# Patient Record
Sex: FEMALE | Race: WHITE | NOT HISPANIC OR LATINO | Employment: OTHER | URBAN - METROPOLITAN AREA
[De-identification: names, ages, dates, MRNs, and addresses within clinical notes are randomized per-mention and may not be internally consistent; named-entity substitution may affect disease eponyms.]

---

## 2017-01-04 ENCOUNTER — TELEPHONE (OUTPATIENT)
Dept: OBSTETRICS AND GYNECOLOGY | Facility: CLINIC | Age: 82
End: 2017-01-04

## 2017-01-04 NOTE — TELEPHONE ENCOUNTER
----- Message from Susie Burgos sent at 1/4/2017 10:22 AM CST -----  Contact: self  [phone]  Pt is requesting to speak to you regarding her appt with Dr. Falk. Pls call pt [phone]. Thanks......Kaila

## 2017-01-04 NOTE — TELEPHONE ENCOUNTER
Called pt she is c/o runny stools. Pt advised to try OTC imodium to see if that would help. Pt was not sure who she would need to contact for that. Pt advised to contact her PCP if Imodium does not help.

## 2017-02-02 ENCOUNTER — OFFICE VISIT (OUTPATIENT)
Dept: UROLOGY | Facility: CLINIC | Age: 82
End: 2017-02-02
Payer: MEDICARE

## 2017-02-02 VITALS
HEART RATE: 60 BPM | SYSTOLIC BLOOD PRESSURE: 124 MMHG | WEIGHT: 181 LBS | RESPIRATION RATE: 16 BRPM | DIASTOLIC BLOOD PRESSURE: 72 MMHG | BODY MASS INDEX: 30.9 KG/M2 | HEIGHT: 64 IN

## 2017-02-02 DIAGNOSIS — R31.0 HEMATURIA, GROSS: Primary | ICD-10-CM

## 2017-02-02 PROCEDURE — 99213 OFFICE O/P EST LOW 20 MIN: CPT | Mod: PBBFAC | Performed by: UROLOGY

## 2017-02-02 PROCEDURE — 99203 OFFICE O/P NEW LOW 30 MIN: CPT | Mod: S$PBB,,, | Performed by: UROLOGY

## 2017-02-02 PROCEDURE — 99999 PR PBB SHADOW E&M-EST. PATIENT-LVL III: CPT | Mod: PBBFAC,,, | Performed by: UROLOGY

## 2017-02-02 PROCEDURE — 81002 URINALYSIS NONAUTO W/O SCOPE: CPT | Mod: PBBFAC | Performed by: UROLOGY

## 2017-02-02 RX ORDER — AZELAIC ACID 0.15 G/G
GEL TOPICAL 2 TIMES DAILY
COMMUNITY

## 2017-02-02 RX ORDER — LEVOTHYROXINE SODIUM 100 UG/1
TABLET ORAL
COMMUNITY
Start: 2017-01-04

## 2017-02-02 RX ORDER — DIPHENOXYLATE HYDROCHLORIDE AND ATROPINE SULFATE 2.5; .025 MG/1; MG/1
TABLET ORAL
COMMUNITY
Start: 2017-01-06 | End: 2017-04-05

## 2017-02-02 NOTE — PROGRESS NOTES
"  Subjective:       Ella Sarmiento is a 81 y.o. female who is a new patient who was referred by Dr Cooper for evaluation of hematuria.      Hematuria  Patient complains of gross hematuria. Onset of hematuria was 2 months ago and was sudden in onset. She had some crampy, abdominal pain very briefly at that time. There is not a history of nephrolithiasis. There is not a history of urologic trauma. Other urologic symptoms include rare urge incontinence. Patient admits to history of no risk factors for cancer. Patient denies history of chronic Vera catheter,  surgeries, occupational exposure, sexually transmitted diseases, tobacco use, trauma and urolithiasis. Prior workup has been none. Denies UTIs.       The following portions of the patient's history were reviewed and updated as appropriate: allergies, current medications, past family history, past medical history, past social history, past surgical history and problem list.    Review of Systems  Constitutional: no fever or chills  ENT: no nasal congestion or sore throat  Respiratory: no cough or shortness of breath  Cardiovascular: no chest pain or palpitations  Gastrointestinal: no nausea or vomiting, tolerating diet  Genitourinary: as per HPI  Hematologic/Lymphatic: no easy bruising or lymphadenopathy  Musculoskeletal: no arthralgias or myalgias  Skin: no rashes or lesions  Neurological: no seizures or tremors  Behavioral/Psych: no auditory or visual hallucinations       Objective:    Vitals:   Visit Vitals    /72    Pulse 60    Resp 16    Ht 5' 4" (1.626 m)    Wt 82.1 kg (181 lb)    BMI 31.07 kg/m2       Physical Exam   General: well developed, well nourished in no acute distress  Head: normocephalic, atraumatic  Neck: supple, trachea midline, no obvious enlargement of thyroid  HEENT: EOMI, mucus membranes moist, sclera anicteric, no hearing impairment  Lungs: symmetric expansion, non-labored breathing  Cardiovascular: regular rate and rhythm, " normal pulses  Abdomen: soft, non tender, non distended, no palpable masses, no hepatosplenomegaly, no hernias, no CVA tenderness  Musculoskeletal: no peripheral edema, normal ROM in bilateral upper and lower extremities  Lymphatics: no cervical or inguinal lymphadenopathy  Skin: no rashes or lesions  Neuro: alert and oriented x 3, no gross deficits  Psych: normal judgment and insight, normal mood/affect and non-anxious  Genitourinary:   patient declined exam       Lab Review   Urine analysis today in clinic shows negative for all components     No results found for: WBC, HGB, HCT, MCV, PLT  No results found for: CREATININE, BUN    Imaging  NA         Assessment/Plan:      1. Hematuria, gross    - Discussed etiology and workup of hematuria   - Ucx - urine clear today, no need for UCx   - CT urogram   - Office cystoscopy          Follow up in 3-4 weeks for cystoscopy

## 2017-02-02 NOTE — LETTER
February 2, 2017      Taqureia Cooper MD  [address]  [address]  [place] [address]           [facility]  [address] [facility] [address]  [address]  [place] [facility] [place] [address]  Phone: [phone]          Patient: Ella Sarmiento   MR Number: [id number]   YOB: 1935   Date of Visit: 2/2/2017       Dear Dr. Taqueria Cooper:    Thank you for referring Ella Sarmiento to me for evaluation. Attached you will find relevant portions of my assessment and plan of care.    If you have questions, please do not hesitate to call me. I look forward to following Ella Sarmiento along with you.    Sincerely,    Ifrah Falk MD    Enclosure  CC:  No Recipients    If you would like to receive this communication electronically, please contact [email or web address] or [phone] to request more information on [employer] [facility] access.    For providers and/or their staff who would like to refer a patient to [facility], please contact us through our one-stop-shop provider referral line, [facility], at [phone].    If you feel you have received this communication in error or would no longer like to receive these types of communications, please e-mail [email or web address]

## 2017-02-02 NOTE — MR AVS SNAPSHOT
[facility]  [address] [facility]  [address]  [place] [address]  Phone: [phone]                  Ella Sarmiento   2017 1:20 PM   Office Visit    Description:  Female : 1935   Provider:  Ifrah Fakl MD   Department:  [facility]           Reason for Visit     Hematuria           Diagnoses this Visit        Comments    Hematuria, gross    -  Primary            To Do List           Future Appointments        Provider Department Dept Phone    3/29/2017 2:00 PM Taqueria Cooper MD [facility] [place] [facility]/ [phone]      Goals (5 Years of Data)     None      Follow-Up and Disposition     Return in about 4 weeks (around 3/2/2017) for CT scan follow up, Cystoscopy.      [facility]     [facility] Nurse Care Line -  Assistance  Registered nurses in the [facility] provide clinical advisement, health education, appointment booking, and other advisory services.  Call for this free service at [phone].             Medications           STOP taking these medications     estradiol valerate (DELESTROGEN) 20 mg/mL injection Inject 20 mg into the muscle every 28 days.           Verify that the below list of medications is an accurate representation of the medications you are currently taking.  If none reported, the list may be blank. If incorrect, please contact your healthcare provider. Carry this list with you in case of emergency.           Current Medications     aspirin (ECOTRIN) 81 MG EC tablet Take 81 mg by mouth once daily.    azelaic acid (FINACEA) 15 % cream Apply topically 2 (two) times daily.    cetirizine (ZYRTEC) 10 MG tablet Take 10 mg by mouth once daily.    cyanocobalamin (B-12 DOTS) 500 MCG tablet Take 500 mcg by mouth once daily.    DIPHENHYDRAMINE HCL (BENADRYL ALLERGY ORAL) Take by mouth.    diphenoxylate-atropine 2.5-0.025 mg (LOMOTIL) 2.5-0.025 mg per tablet     econazole nitrate 1 % cream Apply topically once daily.    FIBER  "CHOICE ORAL Take by mouth.    L GASSERI/B BIFIDUM/B LONGUM ([employer] HEALTH ORAL) Take by mouth.    levothyroxine (SYNTHROID) 100 MCG tablet     msm-aloe vera-herbal66-emu oil Gel Apply topically.    multivit-mineral-iron-lutein (CENTRUM SILVER ULTRA WOMEN'S) Tab Take by mouth.           Clinical Reference Information           Your Vitals Were     BP Pulse Resp Height Weight BMI    124/72 60 16 5' 4" (1.626 m) 82.1 kg (181 lb) 31.07 kg/m2      Blood Pressure          Most Recent Value    BP  124/72      Allergies as of 2/2/2017     Keflex [Cephalexin]      Immunizations Administered on Date of Encounter - 2/2/2017     None      Orders Placed During Today's Visit     Future Labs/Procedures Expected by Expires    CT Urogram Abd Pelvis W WO  2/2/2017 2/2/2018    Cystoscopy  As directed 2/2/2018      [facility] Sign-Up     Activating your [facility] account is as easy as 1-2-3!     1) Visit [email or web address], select Sign Up Now, enter this activation code and your date of birth, then select Next.  [email or web address]  Expires: 2/12/2017  1:04 PM      2) Create a username and password to use when you visit [facility] in the future and select a security question in case you lose your password and select Next.    3) Enter your e-mail address and click Sign Up!    Additional Information  If you have questions, please e-mail [email or web address] or call [phone] to talk to our [facility] staff. Remember, [facility] is NOT to be used for urgent needs. For medical emergencies, dial 911.         Language Assistance Services     ATTENTION: Language assistance services are available, free of charge. Please call [phone].      ATENCIÓN: Si petela krystinajoya, tiene a teran disposición servicios gratuitos de asistencia lingüística. Llame al [phone].     CHÚ Ý: N?u b?n nói Ti?ng Vi?t, có các d?ch v? h? tr? ngôn ng? mi?n phí dành cho b?n. G?i s? [phone].         [facility] complies with applicable Federal " civil rights laws and does not discriminate on the basis of race, color, national origin, age, disability, or sex.

## 2017-02-03 LAB
BILIRUB SERPL-MCNC: NORMAL MG/DL
BLOOD URINE, POC: NORMAL
COLOR, POC UA: NORMAL
GLUCOSE UR QL STRIP: NORMAL
KETONES UR QL STRIP: NORMAL
LEUKOCYTE ESTERASE URINE, POC: NORMAL
NITRITE, POC UA: NORMAL
PH, POC UA: 6
PROTEIN, POC: NORMAL
SPECIFIC GRAVITY, POC UA: 1005
UROBILINOGEN, POC UA: NORMAL

## 2017-02-10 ENCOUNTER — TELEPHONE (OUTPATIENT)
Dept: UROLOGY | Facility: CLINIC | Age: 82
End: 2017-02-10

## 2017-02-10 DIAGNOSIS — R31.0 GROSS HEMATURIA: Primary | ICD-10-CM

## 2017-02-10 NOTE — TELEPHONE ENCOUNTER
----- Message from Bernice Rosenthal sent at 2/10/2017 11:00 AM CST -----  Patient will be coming in for a Ct scan patient will need lab orders for creatinine please. Thanks

## 2017-02-15 ENCOUNTER — HOSPITAL ENCOUNTER (OUTPATIENT)
Dept: RADIOLOGY | Facility: HOSPITAL | Age: 82
Discharge: HOME | End: 2017-02-15
Attending: UROLOGY
Payer: MEDICARE

## 2017-02-15 DIAGNOSIS — R31.0 HEMATURIA, GROSS: ICD-10-CM

## 2017-02-15 PROCEDURE — 74178 CT ABD&PLV WO CNTR FLWD CNTR: CPT | Mod: 26,,, | Performed by: RADIOLOGY

## 2017-02-15 PROCEDURE — 74178 CT ABD&PLV WO CNTR FLWD CNTR: CPT | Mod: TC

## 2017-02-15 PROCEDURE — 25500020 PHARM REV CODE 255: Performed by: UROLOGY

## 2017-02-15 RX ADMIN — IOHEXOL 125 ML: 350 INJECTION, SOLUTION INTRAVENOUS at 09:02

## 2017-02-16 ENCOUNTER — TELEPHONE (OUTPATIENT)
Dept: UROLOGY | Facility: CLINIC | Age: 82
End: 2017-02-16

## 2017-02-20 ENCOUNTER — PROCEDURE VISIT (OUTPATIENT)
Dept: UROLOGY | Facility: CLINIC | Age: 82
End: 2017-02-20
Payer: MEDICARE

## 2017-02-20 DIAGNOSIS — R31.0 HEMATURIA, GROSS: ICD-10-CM

## 2017-02-20 PROCEDURE — 52000 CYSTOURETHROSCOPY: CPT | Mod: PBBFAC | Performed by: UROLOGY

## 2017-02-20 NOTE — H&P
"  []Hover for attribution information     Subjective:        Ella Sarmiento is a 81 y.o. female who is a new patient who was referred by Dr Cooper for evaluation of hematuria.      Hematuria  Patient complains of gross hematuria. Onset of hematuria was 2 months ago and was sudden in onset. She had some crampy, abdominal pain very briefly at that time. There is not a history of nephrolithiasis. There is not a history of urologic trauma. Other urologic symptoms include rare urge incontinence. Patient admits to history of no risk factors for cancer. Patient denies history of chronic Vera catheter,  surgeries, occupational exposure, sexually transmitted diseases, tobacco use, trauma and urolithiasis. Prior workup has been none. Denies UTIs.         The following portions of the patient's history were reviewed and updated as appropriate: allergies, current medications, past family history, past medical history, past social history, past surgical history and problem list.     Review of Systems  Constitutional: no fever or chills  ENT: no nasal congestion or sore throat  Respiratory: no cough or shortness of breath  Cardiovascular: no chest pain or palpitations  Gastrointestinal: no nausea or vomiting, tolerating diet  Genitourinary: as per HPI  Hematologic/Lymphatic: no easy bruising or lymphadenopathy  Musculoskeletal: no arthralgias or myalgias  Skin: no rashes or lesions  Neurological: no seizures or tremors  Behavioral/Psych: no auditory or visual hallucinations        Objective:    Vitals:        Visit Vitals    /72    Pulse 60    Resp 16    Ht 5' 4" (1.626 m)    Wt 82.1 kg (181 lb)    BMI 31.07 kg/m2         Physical Exam   General: well developed, well nourished in no acute distress  Head: normocephalic, atraumatic  Neck: supple, trachea midline, no obvious enlargement of thyroid  HEENT: EOMI, mucus membranes moist, sclera anicteric, no hearing impairment  Lungs: symmetric expansion, non-labored " "breathing  Cardiovascular: regular rate and rhythm, normal pulses  Abdomen: soft, non tender, non distended, no palpable masses, no hepatosplenomegaly, no hernias, no CVA tenderness  Musculoskeletal: no peripheral edema, normal ROM in bilateral upper and lower extremities  Lymphatics: no cervical or inguinal lymphadenopathy  Skin: no rashes or lesions  Neuro: alert and oriented x 3, no gross deficits  Psych: normal judgment and insight, normal mood/affect and non-anxious  Genitourinary:   patient declined exam         Lab Review   Urine analysis today in clinic shows negative for all components      No results found for: WBC, HGB, HCT, MCV, PLT  No results found for: CREATININE, BUN     Imaging  NA           Assessment/Plan:      1. Hematuria, gross   - Discussed etiology and workup of hematuria  - Ucx - urine clear today, no need for UCx  - CT urogram  - Office cystoscopy             Cystoscopy  Date/Time: 2/20/2017 9:52 AM  Performed by: LESLY BAKER  Authorized by: LESLY BAKER     Consent Done?:  Yes (Written)  Time out: Immediately prior to procedure a "time out" was called to verify the correct patient, procedure, equipment, support staff and site/side marked as required.    Indications: hematuria    Position:  Dorsal lithotomy  Anesthesia:  Lidocaine jelly  Patient sedated?: No    Preparation: Patient was prepped and draped in usual sterile fashion      Scope type:  Flexible cystoscope  Stent inserted: No    Stent removed: No    External exam normal: Yes    Digital exam performed: No    Urethra normal: Yes  Bladder neck normal: Bladder neck normal   Bladder normal: No         Number of tumors:  1       Tumor 1:          Size (mm):  25         Anatomy:  Pedunculated         Location:  L Laterall Wall    Patient tolerance:  Patient tolerated the procedure well with no immediate complications     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 Recommend TURBT.            "

## 2017-02-20 NOTE — PROCEDURES
"Cystoscopy  Date/Time: 2/20/2017 9:52 AM  Performed by: LESLY BAKER  Authorized by: LESLY BAKER     Consent Done?:  Yes (Written)  Time out: Immediately prior to procedure a "time out" was called to verify the correct patient, procedure, equipment, support staff and site/side marked as required.    Indications: hematuria    Position:  Dorsal lithotomy  Anesthesia:  Lidocaine jelly  Patient sedated?: No    Preparation: Patient was prepped and draped in usual sterile fashion      Scope type:  Flexible cystoscope  Stent inserted: No    Stent removed: No    External exam normal: Yes    Digital exam performed: No    Urethra normal: Yes  Bladder neck normal: Bladder neck normal   Bladder normal: No         Number of tumors:  1       Tumor 1:          Size (mm):  25         Anatomy:  Pedunculated         Location:  L Laterall Wall    Patient tolerance:  Patient tolerated the procedure well with no immediate complications     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 Recommend TURBT.        "

## 2017-02-24 ENCOUNTER — HOSPITAL ENCOUNTER (OUTPATIENT)
Dept: PREADMISSION TESTING | Facility: HOSPITAL | Age: 82
Discharge: HOME | End: 2017-02-24
Attending: UROLOGY
Payer: MEDICARE

## 2017-02-24 ENCOUNTER — HOSPITAL ENCOUNTER (OUTPATIENT)
Dept: RADIOLOGY | Facility: HOSPITAL | Age: 82
Discharge: HOME | End: 2017-02-24
Attending: UROLOGY
Payer: MEDICARE

## 2017-02-24 VITALS
WEIGHT: 175.5 LBS | HEART RATE: 75 BPM | TEMPERATURE: 97 F | OXYGEN SATURATION: 97 % | HEIGHT: 62 IN | DIASTOLIC BLOOD PRESSURE: 85 MMHG | SYSTOLIC BLOOD PRESSURE: 129 MMHG | RESPIRATION RATE: 16 BRPM | BODY MASS INDEX: 32.3 KG/M2

## 2017-02-24 DIAGNOSIS — Z01.818 PRE-OP TESTING: Primary | ICD-10-CM

## 2017-02-24 LAB
ANION GAP SERPL CALC-SCNC: 7 MMOL/L
BASOPHILS # BLD AUTO: 0.08 K/UL
BASOPHILS NFR BLD: 1.1 %
BUN SERPL-MCNC: 16 MG/DL
CALCIUM SERPL-MCNC: 9.7 MG/DL
CHLORIDE SERPL-SCNC: 106 MMOL/L
CO2 SERPL-SCNC: 28 MMOL/L
CREAT SERPL-MCNC: 0.8 MG/DL
DIFFERENTIAL METHOD: ABNORMAL
EOSINOPHIL # BLD AUTO: 0.1 K/UL
EOSINOPHIL NFR BLD: 1.3 %
ERYTHROCYTE [DISTWIDTH] IN BLOOD BY AUTOMATED COUNT: 12.9 %
EST. GFR  (AFRICAN AMERICAN): >60 ML/MIN/1.73 M^2
EST. GFR  (NON AFRICAN AMERICAN): >60 ML/MIN/1.73 M^2
GLUCOSE SERPL-MCNC: 95 MG/DL
HCT VFR BLD AUTO: 43.4 %
HGB BLD-MCNC: 14.7 G/DL
LYMPHOCYTES # BLD AUTO: 2.4 K/UL
LYMPHOCYTES NFR BLD: 31.2 %
MCH RBC QN AUTO: 31.5 PG
MCHC RBC AUTO-ENTMCNC: 33.9 %
MCV RBC AUTO: 93 FL
MONOCYTES # BLD AUTO: 0.7 K/UL
MONOCYTES NFR BLD: 8.6 %
NEUTROPHILS # BLD AUTO: 4.3 K/UL
NEUTROPHILS NFR BLD: 57.7 %
PLATELET # BLD AUTO: 191 K/UL
PMV BLD AUTO: 10.4 FL
POTASSIUM SERPL-SCNC: 4.3 MMOL/L
RBC # BLD AUTO: 4.67 M/UL
SODIUM SERPL-SCNC: 141 MMOL/L
WBC # BLD AUTO: 7.53 K/UL

## 2017-02-24 PROCEDURE — 71020 XR CHEST PA AND LATERAL PRE-OP: CPT | Mod: 26,,, | Performed by: RADIOLOGY

## 2017-02-24 PROCEDURE — 93005 ELECTROCARDIOGRAM TRACING: CPT

## 2017-02-24 PROCEDURE — 71020 XR CHEST PA AND LATERAL PRE-OP: CPT | Mod: TC

## 2017-02-24 PROCEDURE — 36415 COLL VENOUS BLD VENIPUNCTURE: CPT

## 2017-02-24 PROCEDURE — 80048 BASIC METABOLIC PNL TOTAL CA: CPT

## 2017-02-24 PROCEDURE — 85025 COMPLETE CBC W/AUTO DIFF WBC: CPT

## 2017-02-24 RX ORDER — HYDROXYZINE HYDROCHLORIDE 25 MG/1
25 TABLET, FILM COATED ORAL 4 TIMES DAILY PRN
COMMUNITY
End: 2018-04-04

## 2017-02-24 NOTE — DISCHARGE INSTRUCTIONS
Your surgery is scheduled for _Friday March 3, 2017__.    Call [phone] between 2 p.m. and 5 p.m. on   _Thursday__ to find out your arrival time for the day of your surgery.      Please report to SAME DAY SURGERY UNIT on the 2nd FLOOR at _______ a.m.  Use front door entrance. The doors open at 0530 am.          INSTRUCTIONS IMPORTANT!!!  ¨ Do not eat or drink after 12 midnight-including water. OK to brush teeth, no   gum, candy or mints!    ¨ Take only these medicines with a small swallow of water-morning of surgery.  Take Synthroid and Zyrtec (if needed) am of surgery with swallow of water.        _x___  Prep instructions:  SHOWER     _x___  Do not wear makeup, including mascara. WEARING EYE MAKEUP MAY  LEAD TO SERIOUS EYE INJURY during surgery.  _x___  No powder, lotions or creams to surgical area.  _x___  You may wear only deodorant on the day of surgery.  _x___  Please remove all jewelry, including piercings and leave at home.  _x___  No money or valuables needed. Please leave at home.  You may bring your cell phone.    _x___  If going home the same day, arrange for a ride home. You will not be able to   drive if Anesthesia was used.  _x___  Wear loose fitting clothing. Allow for dressings, bandages.  _x___  Stop Aspirin, Ibuprofen, Motrin and Aleve at least 3-5 days before surgery, unless otherwise instructed by your doctor, or the nurse.              You MAY use Tylenol/acetaminophen until day of surgery.    _x___  Call MD for temperature above 101 degrees.        _x___ Stop taking any Fish Oil supplement or any Vitamins that contain Vitamin E at least 5 days prior to surgery.            I have read or had read and explained to me, and understand the above information.  Additional comments or instructions:Please call   [phone] if you have any questions regarding the instructions above.

## 2017-02-24 NOTE — IP AVS SNAPSHOT
[place] [address] [address] [place] [address]  Phone: [phone]           Patient Discharge Instructions    Our goal is to set you up for success. This packet includes information on your condition, medications, and your home care. It will help you to care for yourself so you don't get sicker.     Please ask your nurse if you have any questions.        There are many details to remember when preparing for your surgery. Here is what you will need to do, please ask your nurse if there are more specific instructions and if you have any questions:    1. 24 hours before procedure Do not smoke or drink alcoholic beverages 24 hours prior to your procedure    2. Eating before procedure Do not eat or drink anything 8 hours before your procedure - this includes gum, mints, and candy.     3. Day of procedure Please remove all jewelry for the procedure. If you wear contact lenses, dentures, hearing aids or glasses, bring a container to put them in during your surgery and give to a family member for safekeeping.  If your doctor has scheduled you for an overnight stay, bring a small overnight bag with any personal items that you need.    4. After procedure Make arrangements in advance for transportation home by a responsible adult. It is not safe to drive a vehicle during the 24 hours following surgery.     PLEASE NOTE: You may be contacted the day before your surgery to confirm your surgery date and arrival time. The Surgery schedule has many variables which may affect the time of your surgery case. Family members should be available if your surgery time changes.                [facility]  Unless otherwise directed by your provider, please contact [facility], our nurse care line that is available for 24/7 assistance.     [phone] (toll-free)    Registered nurses in the [facility] provide clinical advisement, health education, appointment booking, and other advisory  services.                    ** Verify the list of medication(s) below is accurate and up to date. Carry this with you in case of emergency. If your medications have changed, please notify your healthcare provider.             Medication List      TAKE these medications        Additional Info                      aspirin 81 MG EC tablet   Commonly known as:  ECOTRIN   Refills:  0   Dose:  81 mg    Instructions:  Take 81 mg by mouth once daily.     Begin Date    AM    Noon    PM    Bedtime       B-12 DOTS 500 MCG tablet   Refills:  0   Dose:  500 mcg   Generic drug:  cyanocobalamin    Instructions:  Take 500 mcg by mouth once daily.     Begin Date    AM    Noon    PM    Bedtime       BENADRYL ALLERGY ORAL   Refills:  0    Instructions:  Take by mouth.     Begin Date    AM    Noon    PM    Bedtime       CENTRUM SILVER ULTRA WOMEN'S Tab   Refills:  0   Generic drug:  multivit-mineral-iron-lutein    Instructions:  Take by mouth.     Begin Date    AM    Noon    PM    Bedtime       cetirizine 10 MG tablet   Commonly known as:  ZYRTEC   Refills:  0   Dose:  10 mg    Instructions:  Take 10 mg by mouth once daily.     Begin Date    AM    Noon    PM    Bedtime       diphenoxylate-atropine 2.5-0.025 mg 2.5-0.025 mg per tablet   Commonly known as:  LOMOTIL   Refills:  0      Begin Date    AM    Noon    PM    Bedtime       econazole nitrate 1 % cream   Refills:  0    Instructions:  Apply topically once daily.     Begin Date    AM    Noon    PM    Bedtime       FIBER CHOICE ORAL   Refills:  0    Instructions:  Take by mouth.     Begin Date    AM    Noon    PM    Bedtime       FINACEA 15 % cream   Refills:  0   Generic drug:  azelaic acid    Instructions:  Apply topically 2 (two) times daily.     Begin Date    AM    Noon    PM    Bedtime       hydrOXYzine HCl 25 MG tablet   Commonly known as:  ATARAX   Refills:  0   Dose:  25 mg    Instructions:  Take 25 mg by mouth 4 (four) times daily as needed for Itching.     Begin Date    AM     Noon    PM    Bedtime       levothyroxine 100 MCG tablet   Commonly known as:  SYNTHROID   Refills:  0      Begin Date    AM    Noon    PM    Bedtime       msm-aloe vera-herbal66-emu oil Gel   Refills:  0    Instructions:  Apply topically.     Begin Date    AM    Noon    PM    Bedtime       [facility] [employer] [facility] ORAL   Refills:  0    Instructions:  Take by mouth.     Begin Date    AM    Noon    PM    Bedtime                  Please bring to all follow up appointments:    1. A copy of your discharge instructions.  2. All medicines you are currently taking in their original bottles.  3. Identification and insurance card.    Please arrive 15 minutes ahead of scheduled appointment time.    Please call 24 hours in advance if you must reschedule your appointment and/or time.        Your Scheduled Appointments     Mar 29, 2017  2:00 PM CDT   Established Patient Visit with Taqueria Cooper MD   [facility]    [address] [facility] [address]  [address]  [place] [address] [phone]              Your Future Surgeries/Procedures     Mar 03, 2017   Surgery with Ifrah Falk MD   [facility])    [address] [facility] [address] [phone]                  Discharge Instructions         Your surgery is scheduled for _Friday March 3, 2017__.    Call [phone] between 2 p.m. and 5 p.m. on   _Thursday__ to find out your arrival time for the day of your surgery.      Please report to SAME DAY SURGERY UNIT on the 2nd FLOOR at _______ a.m.  Use front door entrance. The doors open at 0530 am.          INSTRUCTIONS IMPORTANT!!!  ¨ Do not eat or drink after 12 midnight-including water. OK to brush teeth, no   gum, candy or mints!    ¨ Take only these medicines with a small swallow of water-morning of surgery.  Take Synthroid and Zyrtec (if needed) am of surgery with swallow of water.        _x___  Prep instructions:  SHOWER     _x___  Do not wear makeup,  including mascara. WEARING EYE MAKEUP MAY  LEAD TO SERIOUS EYE INJURY during surgery.  _x___  No powder, lotions or creams to surgical area.  _x___  You may wear only deodorant on the day of surgery.  _x___  Please remove all jewelry, including piercings and leave at home.  _x___  No money or valuables needed. Please leave at home.  You may bring your cell phone.    _x___  If going home the same day, arrange for a ride home. You will not be able to   drive if Anesthesia was used.  _x___  Wear loose fitting clothing. Allow for dressings, bandages.  _x___  Stop Aspirin, Ibuprofen, Motrin and Aleve at least 3-5 days before surgery, unless otherwise instructed by your doctor, or the nurse.              You MAY use Tylenol/acetaminophen until day of surgery.    _x___  Call MD for temperature above 101 degrees.        _x___ Stop taking any Fish Oil supplement or any Vitamins that contain Vitamin E at least 5 days prior to surgery.            I have read or had read and explained to me, and understand the above information.  Additional comments or instructions:Please call   [phone] if you have any questions regarding the instructions above.                 Admission Information     Date & Time Provider Department CSN    2/24/2017  1:30 PM Ifrah Falk MD [facility] [id number]      Care Providers     Provider Role Specialty Primary office phone    Ifrah Falk MD Attending Provider Urology [phone]      Your Vitals Were     BP                   129/85 (BP Location: Left arm, Patient Position: Sitting, BP Method: Automatic)           Recent Lab Values     No lab values to display.      Allergies as of 2/24/2017        Reactions    Keflex [Cephalexin]       Advance Directives     An advance directive is a document which, in the event you are no longer able to make decisions for yourself, tells your healthcare team what kind of treatment you do or do not want to receive, or who you would  like to make those decisions for you.  If you do not currently have an advance directive, [facility] encourages you to create one.  For more information call:  [phone]), [phone] ([phone]),  or log on to [email or web address].        Language Assistance Services     ATTENTION: Language assistance services are available, free of charge. Please call [phone].      ATENCIÓN: Si habla español, tiene a teran disposición servicios gratuitos de asistencia lingüística. Llame al [phone].     CHÚ Ý: N?u b?n nói Ti?ng Vi?t, có các d?ch v? h? tr? ngôn ng? mi?n phí dành cho b?n. G?i s? [phone].        [facility] Sign-Up     Activating your [facility] account is as easy as 1-2-3!     1) Visit [email or web address], select Sign Up Now, enter this activation code and your date of birth, then select Next.  [email or web address]  Expires: 4/10/2017  2:11 PM      2) Create a username and password to use when you visit [facility] in the future and select a security question in case you lose your password and select Next.    3) Enter your e-mail address and click Sign Up!    Additional Information  If you have questions, please e-mail [email or web address] or call [phone] to talk to our [facility] staff. Remember, [facility] is NOT to be used for urgent needs. For medical emergencies, dial 911.          [facility] complies with applicable Federal civil rights laws and does not discriminate on the basis of race, color, national origin, age, disability, or sex.

## 2017-03-02 ENCOUNTER — ANESTHESIA EVENT (OUTPATIENT)
Dept: SURGERY | Facility: HOSPITAL | Age: 82
End: 2017-03-02
Payer: MEDICARE

## 2017-03-03 ENCOUNTER — ANESTHESIA (OUTPATIENT)
Dept: SURGERY | Facility: HOSPITAL | Age: 82
End: 2017-03-03
Payer: MEDICARE

## 2017-03-03 ENCOUNTER — HOSPITAL ENCOUNTER (OUTPATIENT)
Facility: HOSPITAL | Age: 82
Discharge: HOME | End: 2017-03-03
Attending: UROLOGY | Admitting: UROLOGY
Payer: MEDICARE

## 2017-03-03 ENCOUNTER — SURGERY (OUTPATIENT)
Age: 82
End: 2017-03-03

## 2017-03-03 VITALS
DIASTOLIC BLOOD PRESSURE: 62 MMHG | SYSTOLIC BLOOD PRESSURE: 116 MMHG | WEIGHT: 175.5 LBS | TEMPERATURE: 98 F | HEART RATE: 62 BPM | OXYGEN SATURATION: 98 % | HEIGHT: 62 IN | RESPIRATION RATE: 18 BRPM | BODY MASS INDEX: 32.3 KG/M2

## 2017-03-03 DIAGNOSIS — R31.0 HEMATURIA, GROSS: Primary | ICD-10-CM

## 2017-03-03 PROCEDURE — 37000008 HC ANESTHESIA 1ST 15 MINUTES: Performed by: UROLOGY

## 2017-03-03 PROCEDURE — 63600175 PHARM REV CODE 636 W HCPCS

## 2017-03-03 PROCEDURE — 37000009 HC ANESTHESIA EA ADD 15 MINS: Performed by: UROLOGY

## 2017-03-03 PROCEDURE — 52005 CYSTO W/URTRL CATHJ: CPT | Mod: 59,,, | Performed by: UROLOGY

## 2017-03-03 PROCEDURE — 36000707: Performed by: UROLOGY

## 2017-03-03 PROCEDURE — 25500020 PHARM REV CODE 255: Performed by: UROLOGY

## 2017-03-03 PROCEDURE — 27201423 OPTIME MED/SURG SUP & DEVICES STERILE SUPPLY: Performed by: UROLOGY

## 2017-03-03 PROCEDURE — D9220A PRA ANESTHESIA: Mod: CRNA,,, | Performed by: NURSE ANESTHETIST, CERTIFIED REGISTERED

## 2017-03-03 PROCEDURE — 36000706: Performed by: UROLOGY

## 2017-03-03 PROCEDURE — 52235 CYSTOSCOPY AND TREATMENT: CPT | Mod: ,,, | Performed by: UROLOGY

## 2017-03-03 PROCEDURE — 25000003 PHARM REV CODE 250: Performed by: NURSE ANESTHETIST, CERTIFIED REGISTERED

## 2017-03-03 PROCEDURE — 71000015 HC POSTOP RECOV 1ST HR: Performed by: UROLOGY

## 2017-03-03 PROCEDURE — D9220A PRA ANESTHESIA: Mod: ANES,,, | Performed by: ANESTHESIOLOGY

## 2017-03-03 PROCEDURE — 71000016 HC POSTOP RECOV ADDL HR: Performed by: UROLOGY

## 2017-03-03 PROCEDURE — 51720 TREATMENT OF BLADDER LESION: CPT | Mod: 59,,, | Performed by: UROLOGY

## 2017-03-03 PROCEDURE — 71000033 HC RECOVERY, INTIAL HOUR: Performed by: UROLOGY

## 2017-03-03 PROCEDURE — 63600175 PHARM REV CODE 636 W HCPCS: Performed by: NURSE ANESTHETIST, CERTIFIED REGISTERED

## 2017-03-03 PROCEDURE — 88309 TISSUE EXAM BY PATHOLOGIST: CPT | Mod: 26,,,

## 2017-03-03 PROCEDURE — 74420 UROGRAPHY RTRGR +-KUB: CPT | Mod: 26,,, | Performed by: UROLOGY

## 2017-03-03 PROCEDURE — 25000003 PHARM REV CODE 250: Performed by: ANESTHESIOLOGY

## 2017-03-03 PROCEDURE — 71000039 HC RECOVERY, EACH ADD'L HOUR: Performed by: UROLOGY

## 2017-03-03 PROCEDURE — 88309 TISSUE EXAM BY PATHOLOGIST: CPT

## 2017-03-03 PROCEDURE — 25000003 PHARM REV CODE 250

## 2017-03-03 PROCEDURE — C1758 CATHETER, URETERAL: HCPCS | Performed by: UROLOGY

## 2017-03-03 RX ORDER — PHENAZOPYRIDINE HYDROCHLORIDE 100 MG/1
100 TABLET, FILM COATED ORAL
Qty: 9 TABLET | Refills: 0 | Status: SHIPPED | OUTPATIENT
Start: 2017-03-03 | End: 2017-04-05 | Stop reason: CLARIF

## 2017-03-03 RX ORDER — ONDANSETRON 2 MG/ML
4 INJECTION INTRAMUSCULAR; INTRAVENOUS EVERY 12 HOURS PRN
Status: DISCONTINUED | OUTPATIENT
Start: 2017-03-03 | End: 2017-03-03 | Stop reason: HOSPADM

## 2017-03-03 RX ORDER — CIPROFLOXACIN 2 MG/ML
INJECTION, SOLUTION INTRAVENOUS
Status: COMPLETED
Start: 2017-03-03 | End: 2017-03-03

## 2017-03-03 RX ORDER — ACETAMINOPHEN 325 MG/1
650 TABLET ORAL EVERY 4 HOURS PRN
Status: DISCONTINUED | OUTPATIENT
Start: 2017-03-03 | End: 2017-03-03 | Stop reason: HOSPADM

## 2017-03-03 RX ORDER — SODIUM CHLORIDE 0.9 % (FLUSH) 0.9 %
3 SYRINGE (ML) INJECTION
Status: DISCONTINUED | OUTPATIENT
Start: 2017-03-03 | End: 2017-03-03 | Stop reason: HOSPADM

## 2017-03-03 RX ORDER — PROPOFOL 10 MG/ML
VIAL (ML) INTRAVENOUS
Status: DISCONTINUED | OUTPATIENT
Start: 2017-03-03 | End: 2017-03-03

## 2017-03-03 RX ORDER — SODIUM CHLORIDE, SODIUM LACTATE, POTASSIUM CHLORIDE, CALCIUM CHLORIDE 600; 310; 30; 20 MG/100ML; MG/100ML; MG/100ML; MG/100ML
INJECTION, SOLUTION INTRAVENOUS CONTINUOUS
Status: DISCONTINUED | OUTPATIENT
Start: 2017-03-03 | End: 2017-03-03 | Stop reason: HOSPADM

## 2017-03-03 RX ORDER — GLYCOPYRROLATE 0.2 MG/ML
INJECTION INTRAMUSCULAR; INTRAVENOUS
Status: COMPLETED
Start: 2017-03-03 | End: 2017-03-03

## 2017-03-03 RX ORDER — FENTANYL CITRATE 50 UG/ML
INJECTION, SOLUTION INTRAMUSCULAR; INTRAVENOUS
Status: DISCONTINUED | OUTPATIENT
Start: 2017-03-03 | End: 2017-03-03

## 2017-03-03 RX ORDER — DIPHENHYDRAMINE HYDROCHLORIDE 50 MG/ML
25 INJECTION INTRAMUSCULAR; INTRAVENOUS EVERY 6 HOURS PRN
Status: DISCONTINUED | OUTPATIENT
Start: 2017-03-03 | End: 2017-03-03 | Stop reason: HOSPADM

## 2017-03-03 RX ORDER — ONDANSETRON 2 MG/ML
INJECTION INTRAMUSCULAR; INTRAVENOUS
Status: COMPLETED
Start: 2017-03-03 | End: 2017-03-03

## 2017-03-03 RX ORDER — LIDOCAINE HYDROCHLORIDE 10 MG/ML
1 INJECTION, SOLUTION EPIDURAL; INFILTRATION; INTRACAUDAL; PERINEURAL ONCE
Status: DISCONTINUED | OUTPATIENT
Start: 2017-03-03 | End: 2017-03-03 | Stop reason: HOSPADM

## 2017-03-03 RX ORDER — CIPROFLOXACIN 500 MG/1
500 TABLET ORAL 2 TIMES DAILY
Qty: 4 TABLET | Refills: 0 | Status: SHIPPED | OUTPATIENT
Start: 2017-03-03 | End: 2017-03-05

## 2017-03-03 RX ORDER — HYDROCODONE BITARTRATE AND ACETAMINOPHEN 5; 325 MG/1; MG/1
1 TABLET ORAL EVERY 4 HOURS PRN
Status: DISCONTINUED | OUTPATIENT
Start: 2017-03-03 | End: 2017-03-03 | Stop reason: HOSPADM

## 2017-03-03 RX ORDER — PHENYLEPHRINE HYDROCHLORIDE 10 MG/ML
INJECTION INTRAVENOUS
Status: DISCONTINUED | OUTPATIENT
Start: 2017-03-03 | End: 2017-03-03

## 2017-03-03 RX ORDER — CIPROFLOXACIN 2 MG/ML
400 INJECTION, SOLUTION INTRAVENOUS
Status: DISCONTINUED | OUTPATIENT
Start: 2017-03-03 | End: 2017-03-03 | Stop reason: HOSPADM

## 2017-03-03 RX ORDER — NEOSTIGMINE METHYLSULFATE 1 MG/ML
INJECTION, SOLUTION INTRAVENOUS
Status: DISCONTINUED | OUTPATIENT
Start: 2017-03-03 | End: 2017-03-03

## 2017-03-03 RX ORDER — LIDOCAINE HCL/PF 100 MG/5ML
SYRINGE (ML) INTRAVENOUS
Status: DISCONTINUED | OUTPATIENT
Start: 2017-03-03 | End: 2017-03-03

## 2017-03-03 RX ORDER — ONDANSETRON 2 MG/ML
4 INJECTION INTRAMUSCULAR; INTRAVENOUS EVERY 8 HOURS PRN
Status: DISCONTINUED | OUTPATIENT
Start: 2017-03-03 | End: 2017-03-03 | Stop reason: HOSPADM

## 2017-03-03 RX ORDER — HYDROCODONE BITARTRATE AND ACETAMINOPHEN 5; 325 MG/1; MG/1
1 TABLET ORAL EVERY 6 HOURS PRN
Qty: 20 TABLET | Refills: 0 | Status: SHIPPED | OUTPATIENT
Start: 2017-03-03 | End: 2017-03-14

## 2017-03-03 RX ORDER — SUCCINYLCHOLINE CHLORIDE 20 MG/ML
INJECTION INTRAMUSCULAR; INTRAVENOUS
Status: DISCONTINUED | OUTPATIENT
Start: 2017-03-03 | End: 2017-03-03

## 2017-03-03 RX ORDER — HYDROMORPHONE HYDROCHLORIDE 2 MG/ML
0.2 INJECTION, SOLUTION INTRAMUSCULAR; INTRAVENOUS; SUBCUTANEOUS EVERY 5 MIN PRN
Status: DISCONTINUED | OUTPATIENT
Start: 2017-03-03 | End: 2017-03-03 | Stop reason: HOSPADM

## 2017-03-03 RX ORDER — HYDROMORPHONE HYDROCHLORIDE 2 MG/ML
0.5 INJECTION, SOLUTION INTRAMUSCULAR; INTRAVENOUS; SUBCUTANEOUS EVERY 4 HOURS PRN
Status: DISCONTINUED | OUTPATIENT
Start: 2017-03-03 | End: 2017-03-03 | Stop reason: HOSPADM

## 2017-03-03 RX ORDER — ROCURONIUM BROMIDE 10 MG/ML
INJECTION, SOLUTION INTRAVENOUS
Status: DISCONTINUED | OUTPATIENT
Start: 2017-03-03 | End: 2017-03-03

## 2017-03-03 RX ADMIN — GLYCOPYRROLATE 0.2 MG: 0.2 INJECTION, SOLUTION INTRAMUSCULAR; INTRAVENOUS at 11:03

## 2017-03-03 RX ADMIN — CIPROFLOXACIN 400 MG: 2 INJECTION, SOLUTION INTRAVENOUS at 11:03

## 2017-03-03 RX ADMIN — SUCCINYLCHOLINE CHLORIDE 120 MG: 20 INJECTION, SOLUTION INTRAMUSCULAR; INTRAVENOUS at 11:03

## 2017-03-03 RX ADMIN — ROCURONIUM BROMIDE 10 MG: 10 INJECTION, SOLUTION INTRAVENOUS at 11:03

## 2017-03-03 RX ADMIN — PHENYLEPHRINE HYDROCHLORIDE 100 MCG: 10 INJECTION INTRAVENOUS at 11:03

## 2017-03-03 RX ADMIN — GLYCOPYRROLATE 0.4 MG: 0.2 INJECTION, SOLUTION INTRAMUSCULAR; INTRAVENOUS at 12:03

## 2017-03-03 RX ADMIN — NEOSTIGMINE METHYLSULFATE 5 MG: 1 INJECTION INTRAVENOUS at 12:03

## 2017-03-03 RX ADMIN — LIDOCAINE HYDROCHLORIDE 100 MG: 20 INJECTION, SOLUTION INTRAVENOUS at 11:03

## 2017-03-03 RX ADMIN — ONDANSETRON 4 MG: 2 INJECTION, SOLUTION INTRAMUSCULAR; INTRAVENOUS at 11:03

## 2017-03-03 RX ADMIN — PHENYLEPHRINE HYDROCHLORIDE 200 MCG: 10 INJECTION INTRAVENOUS at 11:03

## 2017-03-03 RX ADMIN — IOHEXOL 100 ML: 300 INJECTION, SOLUTION INTRAVENOUS at 11:03

## 2017-03-03 RX ADMIN — SODIUM CHLORIDE, SODIUM LACTATE, POTASSIUM CHLORIDE, AND CALCIUM CHLORIDE: .6; .31; .03; .02 INJECTION, SOLUTION INTRAVENOUS at 10:03

## 2017-03-03 RX ADMIN — ROCURONIUM BROMIDE 15 MG: 10 INJECTION, SOLUTION INTRAVENOUS at 11:03

## 2017-03-03 RX ADMIN — PROPOFOL 140 MG: 10 INJECTION, EMULSION INTRAVENOUS at 11:03

## 2017-03-03 RX ADMIN — FENTANYL CITRATE 100 MCG: 50 INJECTION INTRAMUSCULAR; INTRAVENOUS at 11:03

## 2017-03-03 NOTE — OP NOTE
DATE OF PROCEDURE:  03/03/2017    SURGEON:  Ifrah Falk M.D.    PREOPERATIVE DIAGNOSIS:  Bladder tumor.    POSTOPERATIVE DIAGNOSIS:  Bladder tumor.    PROCEDURES PERFORMED:  Cystoscopy with bilateral retrograde pyelogram,   transurethral resection of bladder tumor, medium size 2 to 5 cm and instillation   of mitomycin into the bladder.    INDICATIONS FOR PROCEDURE:  Ms. Sarmiento is an 81-year-old female who was   referred by Dr. Cooper after complaints of gross hematuria.  She underwent   hematuria workup, which CT and cystoscopy showed a 2.5 cm left bladder wall   tumor.  She presents today for definitive treatment of this tumor.    DESCRIPTION OF PROCEDURE:  Ms. Sarmiento was brought to the Operating Room and   placed under general endotracheal anesthesia.  Full timeout procedures were   performed identifying the correct patient and procedures.  Appropriate IV   antibiotics with ciprofloxacin were given prior to commencement of surgery.  The   patient was placed in dorsal lithotomy position and prepped and draped in the   usual sterile fashion.    A 22-Finnish rigid cystoscope was passed per urethra and into the bladder.  Full   cystoscopy was performed, which showed the 2.5 cm bladder wall tumor in the left   lateral bladder wall.  There were large blood vessels from the area of the   ureteral orifice to the area of the tumor in the lateral bladder wall.  The   tumor was noted to be away from the area of the ureteral orifice.    Bilateral ureteral orifices were able to be easily identified and were   cannulated with the 5-Finnish cone-tip ureteral catheter.  A gentle retrograde   pyelogram was performed with full strength Omnipaque solution and was noted to   have normal caliber ureters bilaterally with no filling defects or other   abnormalities.  Contrast was noted to drain without issue.    Next, we proceeded with placement of the 26-Finnish resectoscope per urethra and   into the bladder.  Bipolar loop  resection was then done to resect the tumor   intermittently.  Care was taken to avoid injury to the bladder wall.  Once then   entirety of the tumor was removed at the base of the tumor was further resected   to include bladder muscle.  No bladder perforations were noted at the time of   resection.  Hemostasis was obtained with coagulation with the bipolar loop.  The   large blood vessels noted from area of the ureteral orifice to the tumor were   cauterized.    The [employer] evacuator was then used to remove the bladder tumor chips from the   bladder.  Once all chips were removed, the area of the resection was again   evaluated and was noted to be hemostatic.  The resectoscope was then removed and   a 16-Iranian Vera catheter was placed per urethra and into the bladder.  A 10   mL of sterile water was placed in the balloon.  Mitomycin-C 40 mg was then   instilled into the bladder and the catheter was clamped.  The patient was then   awakened from general anesthesia and transferred to the PACU in stable   condition.    COMPLICATIONS:  None.    FINDINGS:  A 2.5 cm bladder wall tumor in the left bladder wall, normal   retrograde pyelogram.    ESTIMATED BLOOD LOSS:  10 mL.    DRAINS:  A 16-Iranian Vera catheter.    SPECIMENS:  Bladder tumor.    PATIENT DISPOSITION:  The patient is stable and deemed appropriate for discharge   home after postoperative recovery.  She will have the mitomycin in place for   approximately 1 hour postoperatively.  This will be drained and the Vera   catheter will be removed.  The patient will follow up in approximately 1 to 2   weeks for postoperative check.      EKP/SN  dd: 03/03/2017 12:18:43 (CST)  td: 03/03/2017 12:56:56 (CST)  Doc ID   #[id number]  Job ID #[id number]    CC:

## 2017-03-03 NOTE — ANESTHESIA POSTPROCEDURE EVALUATION
"Anesthesia Post Evaluation    Patient: Ella Sarmiento    Procedure(s) Performed: Procedure(s) (LRB):  TUMOR-BLADDER-TRANSURETHRAL RESECTION, mitomycin instillation (Bilateral)  PYELOGRAM-RETROGRADE (Bilateral)    Final Anesthesia Type: general  Patient location during evaluation: PACU  Patient participation: Yes- Able to Participate  Level of consciousness: awake and alert and oriented  Post-procedure vital signs: reviewed and stable  Pain management: adequate  Airway patency: patent  PONV status at discharge: No PONV  Anesthetic complications: no      Cardiovascular status: blood pressure returned to baseline and hemodynamically stable  Respiratory status: unassisted, spontaneous ventilation and room air  Hydration status: euvolemic  Follow-up not needed.        Visit Vitals    /63    Pulse 60    Temp 37.1 °C (98.7 °F)    Resp 14    Ht 5' 2" (1.575 m)    Wt 79.6 kg (175 lb 7.8 oz)    SpO2 97%    Breastfeeding No    BMI 32.1 kg/m2       Pain/Gonzalo Score: Pain Assessment Performed: Yes (3/3/2017  1:26 PM)  Presence of Pain: denies (3/3/2017  1:26 PM)  Pain Rating Prior to Med Admin: 0 (3/3/2017 12:21 PM)  Gonzalo Score: 10 (3/3/2017  1:26 PM)      "

## 2017-03-03 NOTE — ANESTHESIA PREPROCEDURE EVALUATION
03/03/2017    Pre-operative evaluation for Procedure(s) (LRB):  TUMOR-BLADDER-TRANSURETHRAL RESECTION, mitomycin instillation (Bilateral)  PYELOGRAM-RETROGRADE (Bilateral)    Ella Sarmiento is a 81 y.o. female     Patient Active Problem List   Diagnosis    Menopausal state    Hormone replacement therapy (HRT)    Status post hysterectomy    Bartholin cyst    H/O hematuria    Hematuria, gross       Review of patient's allergies indicates:   Allergen Reactions    Keflex [cephalexin]        No current facility-administered medications on file prior to encounter.      Current Outpatient Prescriptions on File Prior to Encounter   Medication Sig Dispense Refill    cetirizine (ZYRTEC) 10 MG tablet Take 10 mg by mouth once daily.      cyanocobalamin (B-12 DOTS) 500 MCG tablet Take 500 mcg by mouth once daily.      levothyroxine (SYNTHROID) 100 MCG tablet       aspirin (ECOTRIN) 81 MG EC tablet Take 81 mg by mouth once daily.      azelaic acid (FINACEA) 15 % cream Apply topically 2 (two) times daily.      DIPHENHYDRAMINE HCL (BENADRYL ALLERGY ORAL) Take 12.5 mg by mouth daily as needed.       diphenoxylate-atropine 2.5-0.025 mg (LOMOTIL) 2.5-0.025 mg per tablet       econazole nitrate 1 % cream Apply topically once daily.      FIBER CHOICE ORAL Take 1 tablet by mouth daily as needed.       L GASSERI/B BIFIDUM/B LONGUM (MC2 COLON HEALTH ORAL) Take 1 capsule by mouth daily as needed.       msm-aloe vera-herbal66-emu oil Gel Apply 1 application topically daily as needed.       multivit-mineral-iron-lutein (CENTRUM SILVER ULTRA WOMEN'S) Tab Take 1 tablet by mouth daily as needed.          Past Surgical History:   Procedure Laterality Date    COLONOSCOPY      HYSTERECTOMY      KNEE SURGERY      Rt & Lt knees scoped       Social History     Social History    Marital status: Single     Spouse  name: N/A    Number of children: N/A    Years of education: N/A     Occupational History    Not on file.     Social History Main Topics    Smoking status: Never Smoker    Smokeless tobacco: Never Used    Alcohol use Yes      Comment: rarely    Drug use: No    Sexual activity: No     Other Topics Concern    Not on file     Social History Narrative         Vital Signs Range (Last 24H):  Temp:  [36.8 °C (98.3 °F)]   Pulse:  [75]   Resp:  [18]   BP: (138)/(84)   SpO2:  [96 %]       Lab Results   Component Value Date    WBC 7.53 02/24/2017    HGB 14.7 02/24/2017    HCT 43.4 02/24/2017    MCV 93 02/24/2017     02/24/2017           [facility] Anesthesia Evaluation    I have reviewed the Patient Summary Reports.     I have reviewed the Medications.     Review of Systems  Anesthesia Hx:  No problems with previous Anesthesia Denies Hx of Anesthetic complications  History of prior surgery of interest to airway management or planning: Denies Family Hx of Anesthesia complications.   Denies Personal Hx of Anesthesia complications.   Social:  No Alcohol Use, Non-Smoker    Hematology/Oncology:  Hematology Normal   Oncology Normal     EENT/Dental:   chronic allergic rhinitis   Cardiovascular:  Cardiovascular Normal Exercise tolerance: good     Pulmonary:  Pulmonary Normal    Renal/:   Gross hematuria, large bladder tumor   Hepatic/GI:  Hepatic/GI Normal    Musculoskeletal:   Arthritis     Neurological:  Neurology Normal    Endocrine:   Hypothyroidism    Psych:  Psychiatric Normal           Physical Exam  General:  Well nourished, Obesity    Airway/Jaw/Neck:  Airway Findings: Mouth Opening: Small, but > 3cm Tongue: Normal  General Airway Assessment: Adult, Average  Mallampati: III  TM Distance: Normal, at least 6 cm  Jaw/Neck Findings:  Neck ROM: Normal ROM      Dental:  Dental Findings: In tact   Chest/Lungs:  Chest/Lungs Findings: Normal Respiratory Rate, Clear to auscultation     Heart/Vascular:  Heart Findings: Rate:  Normal  Rhythm: Regular Rhythm  Sounds: Normal        Mental Status:  Mental Status Findings:  Alert and Oriented, Cooperative         Anesthesia Plan  Type of Anesthesia, risks & benefits discussed:  Anesthesia Type:  general  Patient's Preference:   Intra-op Monitoring Plan: standard ASA monitors  Intra-op Monitoring Plan Comments:   Post Op Pain Control Plan:   Post Op Pain Control Plan Comments:   Induction:   IV  Beta Blocker:  Patient is not currently on a Beta-Blocker (No further documentation required).       Informed Consent: Patient understands risks and agrees with Anesthesia plan.  Questions answered. Anesthesia consent signed with patient.  ASA Score: 3     Day of Surgery Review of History & Physical:    H&P update referred to the surgeon.         Ready For Surgery From Anesthesia Perspective.

## 2017-03-03 NOTE — INTERVAL H&P NOTE
The patient has been examined and the H&P has been reviewed:    I concur with the findings and no changes have occurred since H&P was written.    Anesthesia/Surgery risks, benefits and alternative options discussed and understood by patient/family.          Active Hospital Problems    Diagnosis  POA    Hematuria, gross [R31.0]  Yes      Resolved Hospital Problems    Diagnosis Date Resolved POA   No resolved problems to display.

## 2017-03-03 NOTE — BRIEF OP NOTE
[facility]  Brief Operative Note     SUMMARY     Surgery Date: 3/3/2017     Surgeon(s) and Role:     * Ifrah Falk MD - Primary    Assisting Surgeon: None    Pre-op Diagnosis:  Hematuria, gross [R31.0]    Post-op Diagnosis:  Post-Op Diagnosis Codes:     * Hematuria, gross [R31.0]    Procedure(s) (LRB):  TUMOR-BLADDER-TRANSURETHRAL RESECTION (2-5cm), mitomycin instillation (Bilateral)  PYELOGRAM-RETROGRADE (Bilateral)    Anesthesia: General    Description of the findings of the procedure: 2.5cm bladder tumor in L lateral bladder wall. Complete resection. Normal RPG.    Findings/Key Components: Mitomycin instillation at conclusion of TURBT.    Estimated Blood Loss:  10cc         Specimens:   Specimen     None      Bladder tumor    Discharge Note    SUMMARY     Admit Date: 3/3/2017    Discharge Date and Time:  03/03/2017 12:12 PM    Hospital Course (synopsis of major diagnoses, care, treatment, and services provided during the course of the hospital stay): Uncomplicated TURBT, mitomycin     Final Diagnosis: Post-Op Diagnosis Codes:     * Hematuria, gross [R31.0]    Disposition: Home or Self Care    Follow Up/Patient Instructions:     Medications:  Reconciled Home Medications:   Current Discharge Medication List      START taking these medications    Details   ciprofloxacin HCl (CIPRO) 500 MG tablet Take 1 tablet (500 mg total) by mouth 2 (two) times daily.  Qty: 4 tablet, Refills: 0      hydrocodone-acetaminophen 5-325mg (NORCO) 5-325 mg per tablet Take 1 tablet by mouth every 6 (six) hours as needed for Pain.  Qty: 20 tablet, Refills: 0      phenazopyridine (PYRIDIUM) 100 MG tablet Take 1 tablet (100 mg total) by mouth 3 (three) times daily with meals.  Qty: 9 tablet, Refills: 0         CONTINUE these medications which have NOT CHANGED    Details   cetirizine (ZYRTEC) 10 MG tablet Take 10 mg by mouth once daily.      cyanocobalamin (B-12 DOTS) 500 MCG tablet Take 500 mcg by mouth once  daily.      levothyroxine (SYNTHROID) 100 MCG tablet       aspirin (ECOTRIN) 81 MG EC tablet Take 81 mg by mouth once daily.      azelaic acid (FINACEA) 15 % cream Apply topically 2 (two) times daily.      DIPHENHYDRAMINE HCL (BENADRYL ALLERGY ORAL) Take 12.5 mg by mouth daily as needed.       diphenoxylate-atropine 2.5-0.025 mg (LOMOTIL) 2.5-0.025 mg per tablet       econazole nitrate 1 % cream Apply topically once daily.      FIBER CHOICE ORAL Take 1 tablet by mouth daily as needed.       hydrOXYzine HCl (ATARAX) 25 MG tablet Take 25 mg by mouth 4 (four) times daily as needed for Itching.      L GASSERI/B BIFIDUM/B LONGUM (Contractually ORAL) Take 1 capsule by mouth daily as needed.       msm-aloe vera-herbal66-emu oil Gel Apply 1 application topically daily as needed.       multivit-mineral-iron-lutein (CENTRUM SILVER ULTRA WOMEN'S) Tab Take 1 tablet by mouth daily as needed.              Discharge Procedure Orders  Diet general     Prior Authorization Order   Order Specific Question Answer Comments   What medications need authorization? MITOMYCIN [[id number]]    Dose 40mg    Frequency once      Activity as tolerated     Call MD for:  temperature >100.4     Call MD for:  persistent nausea and vomiting     Call MD for:  severe uncontrolled pain     Call MD for:  difficulty breathing, headache or visual disturbances     No dressing needed       Follow-up Information     Follow up with Ifrah Falk MD In 2 weeks.    Specialty:  Urology    Why:  For post-op follow up    Contact information:    [address] [place], [place] [address] [phone]          #[id number]

## 2017-03-03 NOTE — TRANSFER OF CARE
"Anesthesia Transfer of Care Note    Patient: Ella Sarmiento    Procedure(s) Performed: Procedure(s) (LRB):  TUMOR-BLADDER-TRANSURETHRAL RESECTION, mitomycin instillation (Bilateral)  PYELOGRAM-RETROGRADE (Bilateral)    Patient location: PACU    Anesthesia Type: general    Transport from OR: Transported from OR on room air with adequate spontaneous ventilation    Post pain: adequate analgesia    Post assessment: no apparent anesthetic complications    Post vital signs: stable    Level of consciousness: awake    Nausea/Vomiting: no nausea/vomiting    Complications: none          Last vitals:   Visit Vitals    BP (!) 141/67 (BP Location: Right arm, Patient Position: Lying, BP Method: Automatic)    Pulse 83    Temp 36.6 °C (97.9 °F) (Tympanic)    Resp 16    Ht 5' 2" (1.575 m)    Wt 79.6 kg (175 lb 7.8 oz)    SpO2 100%    Breastfeeding No    BMI 32.1 kg/m2     "

## 2017-03-03 NOTE — DISCHARGE INSTRUCTIONS
Resume aspirin in 3 days if urine is clear.      Expect blood and/or burning with urination. Drink plenty of fluids.      BATHING/DRESSING:  ? Ok to shower tomorrow    ACTIVITY LEVEL: If you have received sedation or an anesthetic, you may feel sleepy for several hours. Rest until you are more awake. Gradually resume your normal activities.    No heavy lifting.    DIET: You may resume your home diet. If nausea is present, increase your diet gradually with fluids and bland foods.    Medications: Pain medication should be taken only if needed and as directed. If antibiotics are prescribed, the medication should be taken until completed. You                         will be given an updated list of you medications.  ? No driving, alcoholic beverages or signing legal documents for next 24 hours or while taking pain medication    CALL THE DOCTOR:     Some blood in your urine is normal.     Redness, swelling, foul smell  or fever over 101.   Shortness of breath, Coughing Up Bloody Sputum, or Pains or Swelling in your Calves..   Persistent pain or nausea not relieved by medication.   Problems urinating - unable to urinate or heavy bleeding (with our without clots) in urine.    If any unusual problems or difficulties occur contact your doctor. If you cannot contact your doctor but feel your signs and symptoms warrant a physicians attention return to the emergency room.

## 2017-03-03 NOTE — IP AVS SNAPSHOT
[place] [address] [place] [address]  Phone: [phone]           Patient Discharge Instructions     Our goal is to set you up for success. This packet includes information on your condition, medications, and your home care. It will help you to care for yourself so you don't get sicker and need to go back to the hospital.     Please ask your nurse if you have any questions.        There are many details to remember when preparing to leave the hospital. Here is what you will need to do:    1. Take your medicine. If you are prescribed medications, review your Medication List in the following pages. You may have new medications to  at the pharmacy and others that you'll need to stop taking. Review the instructions for how and when to take your medications. Talk with your doctor or nurses if you are unsure of what to do.     2. Go to your follow-up appointments. Specific follow-up information is listed in the following pages. Your may be contacted by a transition nurse or clinical provider about future appointments. Be sure we have all of the phone numbers to reach you, if needed. Please contact your provider's office if you are unable to make an appointment.     3. Watch for warning signs. Your doctor or nurse will give you detailed warning signs to watch for and when to call for assistance. These instructions may also include educational information about your condition. If you experience any of warning signs to your health, call your doctor.               [facility]  Unless otherwise directed by your provider, please contact [facility], our nurse care line that is available for 24/7 assistance.     [phone] (toll-free)    Registered nurses in the [facility] provide clinical advisement, health education, appointment booking, and other advisory services.                    ** Verify the list of medication(s) below is accurate and up to date.  Carry this with you in case of emergency. If your medications have changed, please notify your healthcare provider.             Medication List      START taking these medications        Additional Info                      ciprofloxacin HCl 500 MG tablet   Commonly known as:  CIPRO   Quantity:  4 tablet   Refills:  0   Dose:  500 mg    Instructions:  Take 1 tablet (500 mg total) by mouth 2 (two) times daily.     Begin Date    AM    Noon    PM    Bedtime       hydrocodone-acetaminophen 5-325mg 5-325 mg per tablet   Commonly known as:  NORCO   Quantity:  20 tablet   Refills:  0   Dose:  1 tablet    Instructions:  Take 1 tablet by mouth every 6 (six) hours as needed for Pain.     Begin Date    AM    Noon    PM    Bedtime       phenazopyridine 100 MG tablet   Commonly known as:  PYRIDIUM   Quantity:  9 tablet   Refills:  0   Dose:  100 mg    Instructions:  Take 1 tablet (100 mg total) by mouth 3 (three) times daily with meals.     Begin Date    AM    Noon    PM    Bedtime         CONTINUE taking these medications        Additional Info                      aspirin 81 MG EC tablet   Commonly known as:  ECOTRIN   Refills:  0   Dose:  81 mg    Instructions:  Take 81 mg by mouth once daily.     Begin Date    AM    Noon    PM    Bedtime       B-12 DOTS 500 MCG tablet   Refills:  0   Dose:  500 mcg   Generic drug:  cyanocobalamin    Instructions:  Take 500 mcg by mouth once daily.     Begin Date    AM    Noon    PM    Bedtime       BENADRYL ALLERGY ORAL   Refills:  0   Dose:  12.5 mg    Instructions:  Take 12.5 mg by mouth daily as needed.     Begin Date    AM    Noon    PM    Bedtime       CENTRUM SILVER ULTRA WOMEN'S Tab   Refills:  0   Dose:  1 tablet   Generic drug:  multivit-mineral-iron-lutein    Instructions:  Take 1 tablet by mouth daily as needed.     Begin Date    AM    Noon    PM    Bedtime       cetirizine 10 MG tablet   Commonly known as:  ZYRTEC   Refills:  0   Dose:  10 mg    Instructions:  Take 10 mg by mouth  once daily.     Begin Date    AM    Noon    PM    Bedtime       diphenoxylate-atropine 2.5-0.025 mg 2.5-0.025 mg per tablet   Commonly known as:  LOMOTIL   Refills:  0      Begin Date    AM    Noon    PM    Bedtime       econazole nitrate 1 % cream   Refills:  0    Instructions:  Apply topically once daily.     Begin Date    AM    Noon    PM    Bedtime       FIBER CHOICE ORAL   Refills:  0   Dose:  1 tablet    Instructions:  Take 1 tablet by mouth daily as needed.     Begin Date    AM    Noon    PM    Bedtime       FINACEA 15 % cream   Refills:  0   Generic drug:  azelaic acid    Instructions:  Apply topically 2 (two) times daily.     Begin Date    AM    Noon    PM    Bedtime       hydrOXYzine HCl 25 MG tablet   Commonly known as:  ATARAX   Refills:  0   Dose:  25 mg    Instructions:  Take 25 mg by mouth 4 (four) times daily as needed for Itching.     Begin Date    AM    Noon    PM    Bedtime       levothyroxine 100 MCG tablet   Commonly known as:  SYNTHROID   Refills:  0      Begin Date    AM    Noon    PM    Bedtime       msm-aloe vera-herbal66-emu oil Gel   Refills:  0   Dose:  1 application    Instructions:  Apply 1 application topically daily as needed.     Begin Date    AM    Noon    PM    Bedtime       Bottlenose ORAL   Refills:  0   Dose:  1 capsule    Instructions:  Take 1 capsule by mouth daily as needed.     Begin Date    AM    Noon    PM    Bedtime            Where to Get Your Medications      You can get these medications from any pharmacy     Bring a paper prescription for each of these medications     ciprofloxacin HCl 500 MG tablet    hydrocodone-acetaminophen 5-325mg 5-325 mg per tablet    phenazopyridine 100 MG tablet                  Please bring to all follow up appointments:    1. A copy of your discharge instructions.  2. All medicines you are currently taking in their original bottles.  3. Identification and insurance card.    Please arrive 15 minutes ahead of scheduled appointment  time.    Please call 24 hours in advance if you must reschedule your appointment and/or time.        Your Scheduled Appointments     Mar 29, 2017  2:00 PM CDT   Established Patient Visit with Taqueria Cooper MD   [facility] ([facility])    [address] [facility] [address]  [address]  [place] [address] [phone]              Follow-up Information     Follow up with Ifrah Falk MD. Call in 2 weeks.    Specialty:  Urology    Why:  For post-op follow up, For Follow-up Post-op Appointment, You can call Mon. AM to make or confirm your appointment.    Contact information:    [address]  [address]  [facility] [address]  [phone]          Discharge Instructions     Future Orders    Activity as tolerated     Call MD for:  difficulty breathing, headache or visual disturbances     Call MD for:  persistent nausea and vomiting     Call MD for:  severe uncontrolled pain     Call MD for:  temperature >100.4     Diet general     Questions:    Total calories:      Fat restriction, if any:      Protein restriction, if any:      Na restriction, if any:      Fluid restriction:      Additional restrictions:      No dressing needed     Prior Authorization Order     Questions:    What medications need authorization?:  MITOMYCIN    Dose:  40mg    Frequency:  once    J-Codes:          Discharge Instructions       Resume aspirin in 3 days if urine is clear.      Expect blood and/or burning with urination. Drink plenty of fluids.      BATHING/DRESSING:  ? Ok to shower tomorrow    ACTIVITY LEVEL: If you have received sedation or an anesthetic, you may feel sleepy for several hours. Rest until you are more awake. Gradually resume your normal activities.    No heavy lifting.    DIET: You may resume your home diet. If nausea is present, increase your diet gradually with fluids and bland foods.    Medications: Pain medication should be taken only if needed and as directed. If antibiotics are prescribed, the medication should be  "taken until completed. You                         will be given an updated list of you medications.  ? No driving, alcoholic beverages or signing legal documents for next 24 hours or while taking pain medication    CALL THE DOCTOR:     Some blood in your urine is normal.     Redness, swelling, foul smell  or fever over 101.   Shortness of breath, Coughing Up Bloody Sputum, or Pains or Swelling in your Calves..   Persistent pain or nausea not relieved by medication.   Problems urinating - unable to urinate or heavy bleeding (with our without clots) in urine.    If any unusual problems or difficulties occur contact your doctor. If you cannot contact your doctor but feel your signs and symptoms warrant a physicians attention return to the emergency room.                Discharge References/Attachments     BLADDER TUMOR RESECTION (TRANSURETHRAL), DISCHARGE INSTRUCTIONS (ENGLISH)    CYSTOGRAPHY (RETROGRADE) (ENGLISH)    MITOMYCIN INJECTION (ENGLISH)    PHENAZOPYRIDINE TABLETS (ENGLISH)    CIPROFLOXACIN TABLETS (ENGLISH)    ACETAMINOPHEN; HYDROCODONE TABLETS OR CAPSULES (ENGLISH)        Primary Diagnosis     Your primary diagnosis was:  Blood In Urine      Admission Information     Date & Time Provider Department CSN    3/3/2017  9:38 AM Ifrah Falk MD [facility] [id number]      Care Providers     Provider Role Specialty Primary office phone    Ifrah Falk MD Attending Provider Urology [phone]    Ifrah Falk MD Surgeon  Urology [phone]      Your Vitals Were     BP Pulse Temp Resp Height Weight    135/78 (BP Location: Right arm, Patient Position: Lying, BP Method: Automatic) 64 98.2 °F (36.8 °C) (Oral) 16 5' 2" (1.575 m) 79.6 kg (175 lb 7.8 oz)    SpO2 BMI             95% 32.1 kg/m2         Recent Lab Values     No lab values to display.      Pending Labs     Order Current Status    Specimen to Pathology - Surgery In process      Allergies as of 3/3/2017     "    Reactions    Keflex [Cephalexin]       Advance Directives     An advance directive is a document which, in the event you are no longer able to make decisions for yourself, tells your healthcare team what kind of treatment you do or do not want to receive, or who you would like to make those decisions for you.  If you do not currently have an advance directive, [facility] encourages you to create one.  For more information call:  [phone]), [phone] ([phone]),  or log on to [email or web address].        Language Assistance Services     ATTENTION: Language assistance services are available, free of charge. Please call [phone].      ATENCIÓN: Si pastora estevez, tiene a teran disposición servicios gratuitos de asistencia lingüística. Llame al [phone].     [facility] Ý: N?u b?n nói Ti?ng Vi?t, có các d?ch v? h? tr? ngôn ng? mi?n phí dành cho b?n. G?i s? [phone].        [facility] Sign-Up     Activating your [facility] account is as easy as 1-2-3!     1) Visit [email or web address], select Sign Up Now, enter this activation code and your date of birth, then select Next.  [email or web address]  Expires: 4/10/2017  2:11 PM      2) Create a username and password to use when you visit [facility] in the future and select a security question in case you lose your password and select Next.    3) Enter your e-mail address and click Sign Up!    Additional Information  If you have questions, please e-mail [email or web address] or call [phone] to talk to our [facility] staff. Remember, [facility] is NOT to be used for urgent needs. For medical emergencies, dial 911.          [facility] complies with applicable Federal civil rights laws and does not discriminate on the basis of race, color, national origin, age, disability, or sex.

## 2017-03-06 ENCOUNTER — TELEPHONE (OUTPATIENT)
Dept: UROLOGY | Facility: CLINIC | Age: 82
End: 2017-03-06

## 2017-03-06 NOTE — TELEPHONE ENCOUNTER
----- Message from Vy Whalen sent at 3/6/2017  1:11 PM CST -----  Contact: SOFIA CAPONE [[id number]]  _x  1st Request  _  2nd Request  _  3rd Request        Who: SOFIA CAPONE [[id number]]    Why: Patient would like to schedule post-op appt on 3/13/17. Thanks!     What Number to Call Back: [phone]    When to Expect a call back: (Before the end of the day)   -- if the call is after 12:00, the call back will be tomorrow.

## 2017-03-14 ENCOUNTER — OFFICE VISIT (OUTPATIENT)
Dept: UROLOGY | Facility: CLINIC | Age: 82
End: 2017-03-14
Payer: MEDICARE

## 2017-03-14 VITALS
HEART RATE: 68 BPM | DIASTOLIC BLOOD PRESSURE: 86 MMHG | HEIGHT: 62 IN | SYSTOLIC BLOOD PRESSURE: 112 MMHG | WEIGHT: 175.5 LBS | RESPIRATION RATE: 14 BRPM | BODY MASS INDEX: 32.3 KG/M2

## 2017-03-14 DIAGNOSIS — C67.2 MALIGNANT NEOPLASM OF LATERAL WALL OF URINARY BLADDER: Primary | ICD-10-CM

## 2017-03-14 PROCEDURE — 99214 OFFICE O/P EST MOD 30 MIN: CPT | Mod: S$PBB,,, | Performed by: UROLOGY

## 2017-03-14 PROCEDURE — 99214 OFFICE O/P EST MOD 30 MIN: CPT | Mod: PBBFAC | Performed by: UROLOGY

## 2017-03-14 PROCEDURE — 99999 PR PBB SHADOW E&M-EST. PATIENT-LVL IV: CPT | Mod: PBBFAC,,, | Performed by: UROLOGY

## 2017-03-14 RX ORDER — CLOTRIMAZOLE AND BETAMETHASONE DIPROPIONATE 10; .64 MG/G; MG/G
CREAM TOPICAL
COMMUNITY
Start: 2017-02-23 | End: 2017-04-05 | Stop reason: CLARIF

## 2017-03-14 NOTE — PROGRESS NOTES
"  Subjective:       Ella Sarmiento is a 81 y.o. female who is an established patient who was referred by Dr Cooper for evaluation of hematuria.      Hematuria  Patient complains of gross hematuria. Onset of hematuria was 2 months ago and was sudden in onset. She had some crampy, abdominal pain very briefly at that time. There is not a history of nephrolithiasis. There is not a history of urologic trauma. Other urologic symptoms include rare urge incontinence. Patient admits to history of no risk factors for cancer. Patient denies history of chronic Vera catheter,  surgeries, occupational exposure, sexually transmitted diseases, tobacco use, trauma and urolithiasis. Prior workup has been none. Denies UTIs.     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 She is s/p TURBT with mitomycib on 3/3/17. 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    The following portions of the patient's history were reviewed and updated as appropriate: allergies, current medications, past family history, past medical history, past social history, past surgical history and problem list.    Review of Systems  Constitutional: no fever or chills  ENT: no nasal congestion or sore throat  Respiratory: no cough or shortness of breath  Cardiovascular: no chest pain or palpitations  Gastrointestinal: no nausea or vomiting, tolerating diet  Genitourinary: as per HPI  Hematologic/Lymphatic: no easy bruising or lymphadenopathy  Musculoskeletal: no arthralgias or myalgias  Skin: no rashes or lesions  Neurological: no seizures or tremors  Behavioral/Psych: no auditory or visual hallucinations       Objective:    Vitals:   /86  Pulse 68  Resp 14  Ht 5' 2" (1.575 m)  Wt 79.6 kg (175 lb 7.8 oz)  BMI 32.1 kg/m2    Physical Exam   General: well developed, well nourished in no acute distress  Head: normocephalic, atraumatic  Neck: supple, trachea midline, no obvious enlargement of thyroid  HEENT: EOMI, mucus membranes moist, sclera " anicteric, no hearing impairment  Lungs: symmetric expansion, non-labored breathing  Cardiovascular: regular rate and rhythm, normal pulses  Abdomen: soft, non tender, non distended, no palpable masses, no hepatosplenomegaly, no hernias, no CVA tenderness  Musculoskeletal: no peripheral edema, normal ROM in bilateral upper and lower extremities  Lymphatics: no cervical or inguinal lymphadenopathy  Skin: no rashes or lesions  Neuro: alert and oriented x 3, no gross deficits  Psych: normal judgment and insight, normal mood/affect and non-anxious  Genitourinary:   patient declined exam       Lab Review   Urine analysis today in clinic shows negative for all components     Lab Results   Component Value Date    WBC 7.53 02/24/2017    HGB 14.7 02/24/2017    HCT 43.4 02/24/2017    MCV 93 02/24/2017     02/24/2017     Lab Results   Component Value Date    CREATININE 0.8 02/24/2017    BUN 16 02/24/2017       Imaging  NA         Assessment/Plan:      1. 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

## 2017-03-14 NOTE — MR AVS SNAPSHOT
[facility]  [address]  [address]  [place] [address]  Phone: [phone]                  Elal Sarmiento   3/14/2017 11:00 AM   Office Visit    Description:  Female : 1935   Provider:  Ifrah Falk MD   Department:  [facility]           Reason for Visit     Follow-up           Diagnoses this Visit        Comments    Malignant neoplasm of lateral wall of urinary bladder    -  Primary            To Do List           Future Appointments        Provider Department Dept Phone    3/29/2017 2:00 PM Taqueria Cooper MD [facility] [phone]      Goals (5 Years of Data)     None      Follow-Up and Disposition     Return in about 4 weeks (around 2017) for Post-op.      [facility]     [facility] Nurse Care Line -  Assistance  Registered nurses in the [facility] provide clinical advisement, health education, appointment booking, and other advisory services.  Call for this free service at [phone].             Medications           STOP taking these medications     hydrocodone-acetaminophen 5-325mg (NORCO) 5-325 mg per tablet Take 1 tablet by mouth every 6 (six) hours as needed for Pain.    econazole nitrate 1 % cream Apply topically once daily.           Verify that the below list of medications is an accurate representation of the medications you are currently taking.  If none reported, the list may be blank. If incorrect, please contact your healthcare provider. Carry this list with you in case of emergency.           Current Medications     aspirin (ECOTRIN) 81 MG EC tablet Take 81 mg by mouth once daily.    azelaic acid (FINACEA) 15 % cream Apply topically 2 (two) times daily.    cetirizine (ZYRTEC) 10 MG tablet Take 10 mg by mouth once daily.    clotrimazole-betamethasone 1-0.05% (LOTRISONE) cream     cyanocobalamin (B-12 DOTS) 500 MCG tablet Take 500 mcg by mouth once daily.    DIPHENHYDRAMINE HCL (BENADRYL ALLERGY ORAL)  "Take 12.5 mg by mouth daily as needed.     diphenoxylate-atropine 2.5-0.025 mg (LOMOTIL) 2.5-0.025 mg per tablet     FIBER CHOICE ORAL Take 1 tablet by mouth daily as needed.     hydrOXYzine HCl (ATARAX) 25 MG tablet Take 25 mg by mouth 4 (four) times daily as needed for Itching.    L GASSERI/B BIFIDUM/B LONGUM (Blurtt ORAL) Take 1 capsule by mouth daily as needed.     levothyroxine (SYNTHROID) 100 MCG tablet     msm-aloe vera-herbal66-emu oil Gel Apply 1 application topically daily as needed.     multivit-mineral-iron-lutein (CENTRUM SILVER ULTRA WOMEN'S) Tab Take 1 tablet by mouth daily as needed.     phenazopyridine (PYRIDIUM) 100 MG tablet Take 1 tablet (100 mg total) by mouth 3 (three) times daily with meals.           Clinical Reference Information           Your Vitals Were     BP Pulse Resp Height Weight BMI    112/86 68 14 5' 2" (1.575 m) 79.6 kg (175 lb 7.8 oz) 32.1 kg/m2      Blood Pressure          Most Recent Value    BP  112/86      Allergies as of 3/14/2017     Codeine    Keflex [Cephalexin]      Immunizations Administered on Date of Encounter - 3/14/2017     None      Orders Placed During Today's Visit      Normal Orders This Visit    Case Request Operating Room: CYSTOSCOPY W/BLADDER BIOPSY,POSSIBLE FULGURATION-BLADDER       [facility] Sign-Up     Activating your [facility] account is as easy as 1-2-3!     1) Visit [email or web address], select Sign Up Now, enter this activation code and your date of birth, then select Next.  [email or web address]  Expires: 4/10/2017  3:11 PM      2) Create a username and password to use when you visit [facility] in the future and select a security question in case you lose your password and select Next.    3) Enter your e-mail address and click Sign Up!    Additional Information  If you have questions, please e-mail [email or web address] or call [phone] to talk to our [facility] staff. Remember, [facility] is NOT to be used for urgent needs. For medical " emergencies, dial 911.         Language Assistance Services     ATTENTION: Language assistance services are available, free of charge. Please call [phone].      ATENCIÓN: Si habla herb, tiene a teran disposición servicios gratuitos de asistencia lingüística. Llame al [phone].     CHÚ Ý: N?u b?n nói Ti?ng Vi?t, có các d?ch v? h? tr? ngôn ng? mi?n phí dành cho b?n. G?i s? [phone].         [facility] complies with applicable Federal civil rights laws and does not discriminate on the basis of race, color, national origin, age, disability, or sex.

## 2017-04-05 NOTE — PRE ADMISSION SCREENING
RN phone pre op done.  Patient instructed to remain NPO after midnight prior to surgery.  Asprin on hold until after procedure.  Expressed understanding of instructions.  Will call for arrival time.

## 2017-04-06 ENCOUNTER — ANESTHESIA EVENT (OUTPATIENT)
Dept: SURGERY | Facility: HOSPITAL | Age: 82
End: 2017-04-06
Payer: MEDICARE

## 2017-04-06 ENCOUNTER — TELEPHONE (OUTPATIENT)
Dept: UROLOGY | Facility: CLINIC | Age: 82
End: 2017-04-06

## 2017-04-06 NOTE — TELEPHONE ENCOUNTER
----- Message from Lisa Kerr RN sent at 4/6/2017 12:13 PM CDT -----  Contact: self      ----- Message -----     From: Jacqueline Fenton     Sent: 4/6/2017  11:52 AM       To: [facility] Staff    Pt calling to discuss rescheduling surgery tomorrow. Please call [phone].

## 2017-04-06 NOTE — TELEPHONE ENCOUNTER
Spoke with patient she is still having procedure tomorrow.  She states she had a little problem this am with gas.

## 2017-04-06 NOTE — TELEPHONE ENCOUNTER
----- Message from Jacqueline Fenton sent at 4/6/2017 11:52 AM CDT -----  Contact: self  Pt calling to discuss rescheduling surgery tomorrow. Please call [phone].

## 2017-04-07 ENCOUNTER — ANESTHESIA (OUTPATIENT)
Dept: SURGERY | Facility: HOSPITAL | Age: 82
End: 2017-04-07
Payer: MEDICARE

## 2017-04-07 ENCOUNTER — SURGERY (OUTPATIENT)
Age: 82
End: 2017-04-07

## 2017-04-07 ENCOUNTER — HOSPITAL ENCOUNTER (OUTPATIENT)
Facility: HOSPITAL | Age: 82
Discharge: HOME | End: 2017-04-07
Attending: UROLOGY | Admitting: UROLOGY
Payer: MEDICARE

## 2017-04-07 VITALS
WEIGHT: 170 LBS | SYSTOLIC BLOOD PRESSURE: 138 MMHG | RESPIRATION RATE: 20 BRPM | HEART RATE: 88 BPM | HEIGHT: 62 IN | DIASTOLIC BLOOD PRESSURE: 84 MMHG | TEMPERATURE: 98 F | BODY MASS INDEX: 31.28 KG/M2 | OXYGEN SATURATION: 100 %

## 2017-04-07 DIAGNOSIS — C67.2 MALIGNANT NEOPLASM OF LATERAL WALL OF URINARY BLADDER: ICD-10-CM

## 2017-04-07 PROCEDURE — 63600175 PHARM REV CODE 636 W HCPCS

## 2017-04-07 PROCEDURE — 88305 TISSUE EXAM BY PATHOLOGIST: CPT | Performed by: PATHOLOGY

## 2017-04-07 PROCEDURE — 25000003 PHARM REV CODE 250: Performed by: NURSE ANESTHETIST, CERTIFIED REGISTERED

## 2017-04-07 PROCEDURE — 25000003 PHARM REV CODE 250: Performed by: ANESTHESIOLOGY

## 2017-04-07 PROCEDURE — 37000008 HC ANESTHESIA 1ST 15 MINUTES: Performed by: UROLOGY

## 2017-04-07 PROCEDURE — 36000707: Performed by: UROLOGY

## 2017-04-07 PROCEDURE — 88341 IMHCHEM/IMCYTCHM EA ADD ANTB: CPT | Mod: 26,,, | Performed by: PATHOLOGY

## 2017-04-07 PROCEDURE — 71000015 HC POSTOP RECOV 1ST HR: Performed by: UROLOGY

## 2017-04-07 PROCEDURE — D9220A PRA ANESTHESIA: Mod: CRNA,,, | Performed by: NURSE ANESTHETIST, CERTIFIED REGISTERED

## 2017-04-07 PROCEDURE — 88342 IMHCHEM/IMCYTCHM 1ST ANTB: CPT | Mod: 26,,, | Performed by: PATHOLOGY

## 2017-04-07 PROCEDURE — D9220A PRA ANESTHESIA: Mod: ANES,,, | Performed by: ANESTHESIOLOGY

## 2017-04-07 PROCEDURE — 25000003 PHARM REV CODE 250: Performed by: UROLOGY

## 2017-04-07 PROCEDURE — 27200651 HC AIRWAY, LMA: Performed by: NURSE ANESTHETIST, CERTIFIED REGISTERED

## 2017-04-07 PROCEDURE — 88305 TISSUE EXAM BY PATHOLOGIST: CPT | Mod: 26,,, | Performed by: PATHOLOGY

## 2017-04-07 PROCEDURE — 52204 CYSTOSCOPY W/BIOPSY(S): CPT | Mod: ,,, | Performed by: UROLOGY

## 2017-04-07 PROCEDURE — 71000033 HC RECOVERY, INTIAL HOUR: Performed by: UROLOGY

## 2017-04-07 PROCEDURE — 25000003 PHARM REV CODE 250

## 2017-04-07 PROCEDURE — 36000706: Performed by: UROLOGY

## 2017-04-07 PROCEDURE — 37000009 HC ANESTHESIA EA ADD 15 MINS: Performed by: UROLOGY

## 2017-04-07 PROCEDURE — 63600175 PHARM REV CODE 636 W HCPCS: Performed by: NURSE ANESTHETIST, CERTIFIED REGISTERED

## 2017-04-07 RX ORDER — SODIUM CHLORIDE 0.9 % (FLUSH) 0.9 %
3 SYRINGE (ML) INJECTION
Status: DISCONTINUED | OUTPATIENT
Start: 2017-04-07 | End: 2017-04-07 | Stop reason: HOSPADM

## 2017-04-07 RX ORDER — PHENAZOPYRIDINE HYDROCHLORIDE 100 MG/1
200 TABLET, FILM COATED ORAL ONCE
Status: COMPLETED | OUTPATIENT
Start: 2017-04-07 | End: 2017-04-07

## 2017-04-07 RX ORDER — LIDOCAINE HYDROCHLORIDE 10 MG/ML
1 INJECTION, SOLUTION EPIDURAL; INFILTRATION; INTRACAUDAL; PERINEURAL ONCE
Status: DISCONTINUED | OUTPATIENT
Start: 2017-04-07 | End: 2017-04-07 | Stop reason: HOSPADM

## 2017-04-07 RX ORDER — PHENAZOPYRIDINE HYDROCHLORIDE 100 MG/1
200 TABLET, FILM COATED ORAL 3 TIMES DAILY PRN
Qty: 18 TABLET | Refills: 0 | Status: SHIPPED | OUTPATIENT
Start: 2017-04-07 | End: 2017-12-15

## 2017-04-07 RX ORDER — CIPROFLOXACIN 2 MG/ML
400 INJECTION, SOLUTION INTRAVENOUS
Status: DISCONTINUED | OUTPATIENT
Start: 2017-04-07 | End: 2017-04-07 | Stop reason: HOSPADM

## 2017-04-07 RX ORDER — HYDROCODONE BITARTRATE AND ACETAMINOPHEN 5; 325 MG/1; MG/1
1 TABLET ORAL EVERY 6 HOURS PRN
Qty: 10 TABLET | Refills: 0 | Status: SHIPPED | OUTPATIENT
Start: 2017-04-07 | End: 2017-12-15

## 2017-04-07 RX ORDER — FENTANYL CITRATE 50 UG/ML
25 INJECTION, SOLUTION INTRAMUSCULAR; INTRAVENOUS EVERY 5 MIN PRN
Status: DISCONTINUED | OUTPATIENT
Start: 2017-04-07 | End: 2017-04-07 | Stop reason: HOSPADM

## 2017-04-07 RX ORDER — ONDANSETRON 2 MG/ML
4 INJECTION INTRAMUSCULAR; INTRAVENOUS DAILY PRN
Status: DISCONTINUED | OUTPATIENT
Start: 2017-04-07 | End: 2017-04-07 | Stop reason: HOSPADM

## 2017-04-07 RX ORDER — CIPROFLOXACIN 2 MG/ML
INJECTION, SOLUTION INTRAVENOUS
Status: COMPLETED
Start: 2017-04-07 | End: 2017-04-07

## 2017-04-07 RX ORDER — HYDROCODONE BITARTRATE AND ACETAMINOPHEN 5; 325 MG/1; MG/1
1 TABLET ORAL EVERY 4 HOURS PRN
Status: DISCONTINUED | OUTPATIENT
Start: 2017-04-07 | End: 2017-04-07 | Stop reason: HOSPADM

## 2017-04-07 RX ORDER — CIPROFLOXACIN 500 MG/1
500 TABLET ORAL 2 TIMES DAILY
Qty: 4 TABLET | Refills: 0 | Status: SHIPPED | OUTPATIENT
Start: 2017-04-07 | End: 2017-04-09

## 2017-04-07 RX ORDER — ACETAMINOPHEN 325 MG/1
650 TABLET ORAL EVERY 4 HOURS PRN
Status: DISCONTINUED | OUTPATIENT
Start: 2017-04-07 | End: 2017-04-07 | Stop reason: HOSPADM

## 2017-04-07 RX ORDER — PROPOFOL 10 MG/ML
VIAL (ML) INTRAVENOUS
Status: DISCONTINUED | OUTPATIENT
Start: 2017-04-07 | End: 2017-04-07

## 2017-04-07 RX ORDER — ONDANSETRON 2 MG/ML
4 INJECTION INTRAMUSCULAR; INTRAVENOUS EVERY 12 HOURS PRN
Status: DISCONTINUED | OUTPATIENT
Start: 2017-04-07 | End: 2017-04-07 | Stop reason: HOSPADM

## 2017-04-07 RX ORDER — LIDOCAINE HCL/PF 100 MG/5ML
SYRINGE (ML) INTRAVENOUS
Status: DISCONTINUED | OUTPATIENT
Start: 2017-04-07 | End: 2017-04-07

## 2017-04-07 RX ORDER — HYDROMORPHONE HYDROCHLORIDE 2 MG/ML
0.2 INJECTION, SOLUTION INTRAMUSCULAR; INTRAVENOUS; SUBCUTANEOUS EVERY 5 MIN PRN
Status: DISCONTINUED | OUTPATIENT
Start: 2017-04-07 | End: 2017-04-07 | Stop reason: HOSPADM

## 2017-04-07 RX ORDER — SODIUM CHLORIDE, SODIUM LACTATE, POTASSIUM CHLORIDE, CALCIUM CHLORIDE 600; 310; 30; 20 MG/100ML; MG/100ML; MG/100ML; MG/100ML
INJECTION, SOLUTION INTRAVENOUS CONTINUOUS
Status: DISCONTINUED | OUTPATIENT
Start: 2017-04-07 | End: 2017-04-07 | Stop reason: HOSPADM

## 2017-04-07 RX ORDER — FAMOTIDINE 20 MG/50ML
INJECTION, SOLUTION INTRAVENOUS
Status: COMPLETED
Start: 2017-04-07 | End: 2017-04-07

## 2017-04-07 RX ORDER — GLYCOPYRROLATE 0.2 MG/ML
INJECTION INTRAMUSCULAR; INTRAVENOUS
Status: COMPLETED
Start: 2017-04-07 | End: 2017-04-07

## 2017-04-07 RX ADMIN — CIPROFLOXACIN 400 MG: 2 INJECTION, SOLUTION INTRAVENOUS at 03:04

## 2017-04-07 RX ADMIN — PROPOFOL 180 MG: 10 INJECTION, EMULSION INTRAVENOUS at 03:04

## 2017-04-07 RX ADMIN — SODIUM CHLORIDE, SODIUM LACTATE, POTASSIUM CHLORIDE, AND CALCIUM CHLORIDE: .6; .31; .03; .02 INJECTION, SOLUTION INTRAVENOUS at 12:04

## 2017-04-07 RX ADMIN — LIDOCAINE HYDROCHLORIDE 75 MG: 20 INJECTION, SOLUTION INTRAVENOUS at 03:04

## 2017-04-07 RX ADMIN — GLYCOPYRROLATE 0.2 MG: 0.2 INJECTION, SOLUTION INTRAMUSCULAR; INTRAVENOUS at 03:04

## 2017-04-07 RX ADMIN — PHENAZOPYRIDINE HYDROCHLORIDE 200 MG: 100 TABLET ORAL at 05:04

## 2017-04-07 RX ADMIN — FAMOTIDINE 20 MG: 20 INJECTION, SOLUTION INTRAVENOUS at 03:04

## 2017-04-07 NOTE — BRIEF OP NOTE
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 Hyperemic area in R posterior. Biopsy and fulguration of each.    Findings/Key Components: Hemostasis obtained.     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  Qty: 4 tablet, Refills: 0      hydrocodone-acetaminophen 5-325mg (NORCO) 5-325 mg per tablet Take 1 tablet by mouth every 6 (six) hours as needed for Pain.  Qty: 10 tablet, Refills: 0      phenazopyridine (PYRIDIUM) 100 MG tablet Take 2 tablets (200 mg total) by mouth 3 (three) times daily as needed (bladder pain).  Qty:  18 tablet, Refills: 0         CONTINUE these medications which have NOT CHANGED    Details   aspirin (ECOTRIN) 81 MG EC tablet Take 81 mg by mouth once daily.      cetirizine (ZYRTEC) 10 MG tablet Take 10 mg by mouth once daily.      cyanocobalamin (B-12 DOTS) 500 MCG tablet Take 500 mcg by mouth once daily.      DIPHENHYDRAMINE HCL (BENADRYL ALLERGY ORAL) Take 12.5 mg by mouth daily as needed.       levothyroxine (SYNTHROID) 100 MCG tablet       multivit-mineral-iron-lutein (CENTRUM SILVER ULTRA WOMEN'S) Tab Take 1 tablet by mouth daily as needed.       vitamin E 600 UNIT capsule Take 600 Units by mouth once daily.      azelaic acid (FINACEA) 15 % cream Apply topically 2 (two) times daily.      FIBER CHOICE ORAL Take 1 tablet by mouth daily as needed.       hydrOXYzine HCl (ATARAX) 25 MG tablet Take 25 mg by mouth 4 (four) times daily as needed for Itching.      L GASSERI/B BIFIDUM/B LONGUM ([facility] ORAL) Take 1 capsule by mouth daily as needed.              Discharge Procedure Orders  Diet general     Activity as tolerated     Call MD for:  temperature >100.4     Call MD for:  persistent nausea and vomiting     Call MD for:  severe uncontrolled pain     Call MD for:  difficulty breathing, headache or visual disturbances     No dressing needed       Follow-up Information     Follow up with Ifrah Falk MD In 2 weeks.    Specialty:  Urology    Contact information:    [address] [place] [id number]  [phone]          #[id number]

## 2017-04-07 NOTE — INTERVAL H&P NOTE
The patient has been examined and the H&P has been reviewed:    I concur with the findings and no changes have occurred since H&P was written.    Anesthesia/Surgery risks, benefits and alternative options discussed and understood by patient/family.          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

## 2017-04-07 NOTE — ANESTHESIA PREPROCEDURE EVALUATION
04/07/2017  Ella Sarmiento is a 81 y.o., female.    [facility] Anesthesia Evaluation    I have reviewed the Patient Summary Reports.     I have reviewed the Medications.     Review of Systems  Anesthesia Hx:  No problems with previous Anesthesia Denies Hx of Anesthetic complications  History of prior surgery of interest to airway management or planning: Denies Family Hx of Anesthesia complications.   Denies Personal Hx of Anesthesia complications.   Social:  No Alcohol Use, Non-Smoker    Hematology/Oncology:  Hematology Normal   Oncology Normal     EENT/Dental:   chronic allergic rhinitis   Cardiovascular:  Cardiovascular Normal Exercise tolerance: good     Pulmonary:  Pulmonary Normal    Renal/:   Gross hematuria, large bladder tumor   Hepatic/GI:  Hepatic/GI Normal    Musculoskeletal:   Arthritis     Neurological:  Neurology Normal    Endocrine:   Hypothyroidism    Psych:  Psychiatric Normal           Physical Exam  General:  Well nourished, Obesity    Airway/Jaw/Neck:  Airway Findings: Mouth Opening: Small, but > 3cm Tongue: Normal  General Airway Assessment: Adult, Average  Mallampati: III  TM Distance: Normal, at least 6 cm  Jaw/Neck Findings:  Neck ROM: Normal ROM      Dental:  Dental Findings: In tact   Chest/Lungs:  Chest/Lungs Findings: Normal Respiratory Rate, Clear to auscultation     Heart/Vascular:  Heart Findings: Rate: Normal  Rhythm: Regular Rhythm  Sounds: Normal        Mental Status:  Mental Status Findings:  Alert and Oriented, Cooperative         Anesthesia Plan  Type of Anesthesia, risks & benefits discussed:  Anesthesia Type:  general  Patient's Preference:   Intra-op Monitoring Plan: standard ASA monitors  Intra-op Monitoring Plan Comments:   Post Op Pain Control Plan:   Post Op Pain Control Plan Comments:   Induction:   IV  Beta Blocker:  Patient is not currently on a Beta-Blocker (No  further documentation required).       Informed Consent: Patient understands risks and agrees with Anesthesia plan.  Questions answered. Anesthesia consent signed with patient.  ASA Score: 3     Day of Surgery Review of History & Physical:    H&P update referred to the surgeon.         Ready For Surgery From Anesthesia Perspective.

## 2017-04-07 NOTE — DISCHARGE INSTRUCTIONS
Expect blood and/or burning with urination. Drink plenty of fluids.    Resume aspirin in 3 days if urine is clear (non-bloody).       ACTIVITY LEVEL: If you have received sedation or an anesthetic, you may feel sleepy for several hours. Rest until you are more awake. Gradually resume your normal activities.       DIET: You may resume your home diet. If nausea is present, increase your diet gradually with fluids and bland foods.      Medications: Pain medication should be taken only if needed and as directed. If antibiotics are prescribed, the medication should be taken until completed. You will be given an updated list of you medications.  ? No driving, alcoholic beverages or signing legal documents for next 24 hours or while taking pain medication        CALL THE DOCTOR:       · Fever over 101°F  · Severe pain that doesnt go away with medication.  · Upset stomach and vomiting that is persistent.  · Problems urinating-unable to urinate or heavy bleeding (with or without clots)    Fall Prevention  Millions of people fall every year and injure themselves. You may have had anesthesia or sedation which may increase your risk of falling. You may have health issues that put you at an increased risk of falling.     Here are ways to reduce your risk of falling.  ·   · Make your home safe by keeping walkways clear of objects you may trip over.  · Use non-slip pads under rugs. Do not use area rugs or small throw rugs.  · Use non-slip mats in bathtubs and showers.  · Install handrails and lights on staircases.  · Do not walk in poorly lit areas.  · Do not stand on chairs or wobbly ladders.  · Use caution when reaching overhead or looking upward. This position can cause a loss of balance.  · Be sure your shoes fit properly, have non-slip bottoms and are in good condition.   · Wear shoes both inside and out. Avoid going barefoot or wearing slippers.  · Be cautious when going up and down stairs, curbs, and when walking on uneven  sidewalks.  · If your balance is poor, consider using a cane or walker.  · If your fall was related to alcohol use, stop or limit alcohol intake.   · If your fall was related to use of sleeping medicines, talk to your doctor about this. You may need to reduce your dosage at bedtime if you awaken during the night to go to the bathroom.    · To reduce the need for nighttime bathroom trips:  ¨ Avoid drinking fluids for several hours before going to bed  ¨ Empty your bladder before going to bed  ¨ Men can keep a urinal at the bedside  · Stay as active as you can. Balance, flexibility, strength, and endurance all come from exercise. They all play a role in preventing falls. Ask your healthcare provider which types of activity are right for you.  · Get your vision checked on a regular basis.  · If you have pets, know where they are before you stand up or walk so you don't trip over them.  · Use night lights.

## 2017-04-07 NOTE — TRANSFER OF CARE
"Anesthesia Transfer of Care Note    Patient: Ella Sarmiento    Procedure(s) Performed: Procedure(s):  cysto bladder biopsy  with fulgeration  FULGURATION-BLADDER    Patient location: PACU    Anesthesia Type: general    Transport from OR: Transported from OR on room air with adequate spontaneous ventilation    Post pain: adequate analgesia    Post assessment: no apparent anesthetic complications    Post vital signs: stable    Level of consciousness: awake, alert and oriented    Nausea/Vomiting: no nausea/vomiting    Complications: none          Last vitals:   Visit Vitals    BP (!) 151/71 (BP Location: Left arm, Patient Position: Lying, BP Method: Automatic)    Pulse 90    Temp 36.6 °C (97.9 °F) (Tympanic)    Resp 16    Ht 5' 2" (1.575 m)    Wt 77.1 kg (170 lb)    SpO2 99%    Breastfeeding No    BMI 31.09 kg/m2     "

## 2017-04-07 NOTE — ANESTHESIA POSTPROCEDURE EVALUATION
"Anesthesia Post Evaluation    Patient: Ella Sarmiento    Procedure(s) Performed: Procedure(s):  cysto bladder biopsy  with fulgeration  FULGURATION-BLADDER    Final Anesthesia Type: general  Patient location during evaluation: PACU  Patient participation: Yes- Able to Participate  Level of consciousness: awake and alert  Post-procedure vital signs: reviewed and stable  Pain management: adequate  Airway patency: patent  PONV status at discharge: No PONV  Anesthetic complications: no      Cardiovascular status: blood pressure returned to baseline  Respiratory status: unassisted  Hydration status: euvolemic  Follow-up not needed.        Visit Vitals    BP (!) 151/71 (BP Location: Left arm, Patient Position: Lying, BP Method: Automatic)    Pulse 90    Temp 36.6 °C (97.9 °F) (Tympanic)    Resp 16    Ht 5' 2" (1.575 m)    Wt 77.1 kg (170 lb)    SpO2 99%    Breastfeeding No    BMI 31.09 kg/m2       Pain/Gonzalo Score: Presence of Pain: karl (4/7/2017 11:46 AM)      "

## 2017-04-08 NOTE — OP NOTE
DATE OF PROCEDURE:  04/07/2017    SURGEON:  Ifrah Falk M.D.    PREOPERATIVE DIAGNOSIS:  Malignancy of bladder.    POSTOPERATIVE DIAGNOSIS:  Malignancy of bladder.    PROCEDURE PERFORMED:  Cystoscopy with bladder biopsy and fulguration.    INDICATIONS FOR PROCEDURE:  Ms. Sarmiento is an 81-year-old female who had gross   hematuria and underwent hematuria workup, was found to have a 2.5 cm lesion in   her left lateral bladder wall.  This was previously resected and had mitomycin.    She had focal high-grade Ta urothelial carcinoma.  She presents today for   repeat TURBT.    DESCRIPTION OF PROCEDURE:  Ms. Sarmiento was brought to the Operating Room, placed   under LMA anesthesia.  Full timeout procedures were performed identifying the   correct patient and procedure.  Appropriate IV antibiotics with ciprofloxacin   were given prior to commencement of surgery.  The patient was placed in the   dorsal lithotomy position and prepped and draped in the usual sterile fashion.    A 22-Icelandic rigid cystoscope was passed per urethra and into the bladder.  Full   cystoscopy was performed, which showed an area of hyperemia in the right   posterior bladder wall as well as an area of fibrinous material at the area of   resection site in the left lateral bladder wall near the left ureteral orifice,   but sparing the UO.    Bladder biopsy was done of the hyperemic area in the right posterior bladder   wall.  Approximately 5 biopsies were taken from this area with the rigid cold   cup biopsy forceps.  Next, attention was turned to the left lateral bladder wall   where the fibrinous exudate was noted.  This was attempted to be cleared with   the biopsy forceps.  Several biopsies of including the detrusor was taken of the   previous resection site.    Next, a Bugbee cautery was used for hemostasis of both biopsy sites.  Once   hemostasis was obtained, the bladder was drained and reinstilled ensuring   hemostasis.  No further bleeding  points were noted and therefore the procedure   was ended.  The patient was then awakened from general anesthesia and   transferred to the PACU in stable condition.    COMPLICATIONS:  None.    FINDINGS:  Hyperemia on the right posterior bladder wall and fibrinous material   from the previous resection site in the left lateral bladder wall.  No gross   tumor noted.    DRAINS:  None.    SPECIMENS:  Right lateral bladder wall biopsy and left lateral bladder wall   biopsy.    ESTIMATED BLOOD LOSS:  Less than 5 mL.    PATIENT DISPOSITION:  The patient is stable and deemed appropriate for discharge   home.  She will follow up in approximately 2 weeks for postoperative check and   review of her pathology.  She will likely undergo BCG in the near future.      EKP/  dd: 04/07/2017 16:23:42 (CDT)  td: 04/07/2017 22:01:43 (CDT)  Doc ID   #[id number]  Job ID #[id number]    CC:

## 2017-04-17 ENCOUNTER — TELEPHONE (OUTPATIENT)
Dept: UROLOGY | Facility: CLINIC | Age: 82
End: 2017-04-17

## 2017-04-17 NOTE — TELEPHONE ENCOUNTER
----- Message from Ifrah Falk MD sent at 4/17/2017  9:02 AM CDT -----  Pathology has not yet returned from her bladder biopsy. Recommend rescheduling until later this week.

## 2017-04-20 ENCOUNTER — OFFICE VISIT (OUTPATIENT)
Dept: UROLOGY | Facility: CLINIC | Age: 82
End: 2017-04-20
Payer: MEDICARE

## 2017-04-20 ENCOUNTER — TELEPHONE (OUTPATIENT)
Dept: UROLOGY | Facility: CLINIC | Age: 82
End: 2017-04-20

## 2017-04-20 VITALS
SYSTOLIC BLOOD PRESSURE: 122 MMHG | RESPIRATION RATE: 14 BRPM | HEART RATE: 68 BPM | DIASTOLIC BLOOD PRESSURE: 80 MMHG | WEIGHT: 169.75 LBS | HEIGHT: 62 IN | BODY MASS INDEX: 31.24 KG/M2

## 2017-04-20 DIAGNOSIS — N39.0 URINARY TRACT INFECTION, SITE NOT SPECIFIED: Primary | ICD-10-CM

## 2017-04-20 DIAGNOSIS — C67.2 MALIGNANT NEOPLASM OF LATERAL WALL OF URINARY BLADDER: Primary | ICD-10-CM

## 2017-04-20 DIAGNOSIS — R30.0 DYSURIA: ICD-10-CM

## 2017-04-20 PROCEDURE — 99999 PR PBB SHADOW E&M-EST. PATIENT-LVL III: CPT | Mod: PBBFAC,,, | Performed by: UROLOGY

## 2017-04-20 PROCEDURE — 87086 URINE CULTURE/COLONY COUNT: CPT

## 2017-04-20 PROCEDURE — 99213 OFFICE O/P EST LOW 20 MIN: CPT | Mod: PBBFAC | Performed by: UROLOGY

## 2017-04-20 PROCEDURE — 99214 OFFICE O/P EST MOD 30 MIN: CPT | Mod: S$PBB,,, | Performed by: UROLOGY

## 2017-04-20 RX ORDER — SULFAMETHOXAZOLE AND TRIMETHOPRIM 800; 160 MG/1; MG/1
1 TABLET ORAL 2 TIMES DAILY
Qty: 14 TABLET | Refills: 0 | Status: SHIPPED | OUTPATIENT
Start: 2017-04-20 | End: 2017-04-27

## 2017-04-20 RX ORDER — PHENAZOPYRIDINE HYDROCHLORIDE 200 MG/1
200 TABLET, FILM COATED ORAL 3 TIMES DAILY PRN
Qty: 12 TABLET | Refills: 0 | Status: SHIPPED | OUTPATIENT
Start: 2017-04-20 | End: 2017-04-30

## 2017-04-20 NOTE — PROGRESS NOTES
"  Subjective:       Ella Sarmiento is a 81 y.o. female who is an established patient who was referred by Dr Cooper for evaluation of hematuria.      Hematuria  Patient complains of gross hematuria. Onset of hematuria was 2 months ago and was sudden in onset. She had some crampy, abdominal pain very briefly at that time. There is not a history of nephrolithiasis. There is not a history of urologic trauma. Other urologic symptoms include rare urge incontinence. Patient admits to history of no risk factors for cancer. Patient denies history of chronic Vera catheter,  surgeries, occupational exposure, sexually transmitted diseases, tobacco use, trauma and urolithiasis. Prior workup has been none. Denies UTIs.     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 She is s/p TURBT with mitomycin on 3/3/17. 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 ReTURBT on 4/7/17 was negative.     PVR (bladder scan) today - 41cc      The following portions of the patient's history were reviewed and updated as appropriate: allergies, current medications, past family history, past medical history, past social history, past surgical history and problem list.    Review of Systems  Constitutional: no fever or chills  ENT: no nasal congestion or sore throat  Respiratory: no cough or shortness of breath  Cardiovascular: no chest pain or palpitations  Gastrointestinal: no nausea or vomiting, tolerating diet  Genitourinary: as per HPI  Hematologic/Lymphatic: no easy bruising or lymphadenopathy  Musculoskeletal: no arthralgias or myalgias  Skin: no rashes or lesions  Neurological: no seizures or tremors  Behavioral/Psych: no auditory or visual hallucinations       Objective:    Vitals:   /80  Pulse 68  Resp 14  Ht 5' 2" (1.575 m)  Wt 77 kg (169 lb 12.1 oz)  BMI 31.05 kg/m2    Physical Exam   General: well developed, well nourished in no acute distress  Head: normocephalic, atraumatic  Neck: supple, trachea midline, no obvious " enlargement of thyroid  HEENT: EOMI, mucus membranes moist, sclera anicteric, no hearing impairment  Lungs: symmetric expansion, non-labored breathing  Cardiovascular: regular rate and rhythm, normal pulses  Abdomen: soft, non tender, non distended, no palpable masses, no hepatosplenomegaly, no hernias, no CVA tenderness  Musculoskeletal: no peripheral edema, normal ROM in bilateral upper and lower extremities  Lymphatics: no cervical or inguinal lymphadenopathy  Skin: no rashes or lesions  Neuro: alert and oriented x 3, no gross deficits  Psych: normal judgment and insight, normal mood/affect and non-anxious  Genitourinary:   patient declined exam       Lab Review   Urine analysis today in clinic shows 250 RBCs    Lab Results   Component Value Date    WBC 7.53 02/24/2017    HGB 14.7 02/24/2017    HCT 43.4 02/24/2017    MCV 93 02/24/2017     02/24/2017     Lab Results   Component Value Date    CREATININE 0.8 02/24/2017    BUN 16 02/24/2017     Imaging  Images and reports were personally reviewed by me and discussed with patient       Assessment/Plan:      1. Bladder cancer   - Discussed etiology and workup of hematuria   - Ucx - urine clear today, no need for UCx   - CT urogram - R lateral bladder wall tumor   - Office cystoscopy - tumor in R bladder   - TURBT + mitomycin on 3/3/17 - LG with focal HG Ta   - ReTURBT 4/7/17 - negative    - Recommend BCG (due to focal HG and large size)   - Cystoscopy in 3 months     2. Dysuria   - UCx today   - Empiric Bactrim today   - Pyridium PRN       Follow up in 4 weeks for BCG x 6 weeks

## 2017-04-20 NOTE — TELEPHONE ENCOUNTER
Spoke to pathology yesterday and was informed that MD is waiting on a special stain to come back on the specimen. (ismael eduardo [phone]). As of this morning, the report was not back. Patient was notified and rescheduled to Monday at 1PM. She states that she is hurting and is requesting medication. Dysuria and pelvic pain reported- I have placed a UCx and asked patient to come to the office to drop off a specimen.

## 2017-04-22 LAB — BACTERIA UR CULT: NORMAL

## 2017-04-24 ENCOUNTER — TELEPHONE (OUTPATIENT)
Dept: UROLOGY | Facility: CLINIC | Age: 82
End: 2017-04-24

## 2017-04-24 NOTE — TELEPHONE ENCOUNTER
Please inform pt that the urine culture was negative for infection. No need for antibiotics at this time. Thanks.

## 2017-04-24 NOTE — TELEPHONE ENCOUNTER
----- Message from Yuko Vega sent at 4/24/2017 10:06 AM CDT -----  Contact: [phone]  Pt is requesting a call back in regards to her health Please call pt at your earliest convenience.  Thanks 1

## 2017-04-24 NOTE — TELEPHONE ENCOUNTER
Patient notified of negative culture results. She is still having bladder pain despite taking pyridium. Patient states she will call back if pain does not get better.

## 2017-06-01 ENCOUNTER — CLINICAL SUPPORT (OUTPATIENT)
Dept: UROLOGY | Facility: CLINIC | Age: 82
End: 2017-06-01
Payer: MEDICARE

## 2017-06-01 VITALS
DIASTOLIC BLOOD PRESSURE: 74 MMHG | BODY MASS INDEX: 31.24 KG/M2 | HEIGHT: 62 IN | WEIGHT: 169.75 LBS | SYSTOLIC BLOOD PRESSURE: 118 MMHG

## 2017-06-01 DIAGNOSIS — N39.0 URINARY TRACT INFECTION, SITE NOT SPECIFIED: Primary | ICD-10-CM

## 2017-06-01 DIAGNOSIS — C67.2 MALIGNANT NEOPLASM OF LATERAL WALL OF URINARY BLADDER: ICD-10-CM

## 2017-06-01 LAB
BILIRUB SERPL-MCNC: ABNORMAL MG/DL
BLOOD URINE, POC: ABNORMAL
COLOR, POC UA: ABNORMAL
GLUCOSE UR QL STRIP: ABNORMAL
KETONES UR QL STRIP: ABNORMAL
LEUKOCYTE ESTERASE URINE, POC: ABNORMAL
NITRITE, POC UA: ABNORMAL
PH, POC UA: 6
PROTEIN, POC: ABNORMAL
SPECIFIC GRAVITY, POC UA: 1000
UROBILINOGEN, POC UA: ABNORMAL

## 2017-06-01 PROCEDURE — 51720 TREATMENT OF BLADDER LESION: CPT | Mod: PBBFAC

## 2017-06-01 PROCEDURE — 99213 OFFICE O/P EST LOW 20 MIN: CPT | Mod: PBBFAC,25 | Performed by: UROLOGY

## 2017-06-01 PROCEDURE — 81002 URINALYSIS NONAUTO W/O SCOPE: CPT | Mod: PBBFAC | Performed by: UROLOGY

## 2017-06-01 PROCEDURE — 96372 THER/PROPH/DIAG INJ SC/IM: CPT | Mod: PBBFAC

## 2017-06-01 PROCEDURE — 51720 TREATMENT OF BLADDER LESION: CPT | Mod: S$PBB,,, | Performed by: UROLOGY

## 2017-06-01 PROCEDURE — 99999 PR PBB SHADOW E&M-EST. PATIENT-LVL III: CPT | Mod: PBBFAC,,, | Performed by: UROLOGY

## 2017-06-01 RX ADMIN — BACILLUS CALMETTE-GUERIN 50 MG: 50 POWDER, FOR SUSPENSION INTRAVESICAL at 04:06

## 2017-06-08 ENCOUNTER — CLINICAL SUPPORT (OUTPATIENT)
Dept: UROLOGY | Facility: CLINIC | Age: 82
End: 2017-06-08
Payer: MEDICARE

## 2017-06-08 VITALS — HEIGHT: 62 IN | BODY MASS INDEX: 30.83 KG/M2 | WEIGHT: 167.56 LBS

## 2017-06-08 DIAGNOSIS — C67.2 MALIGNANT NEOPLASM OF LATERAL WALL OF URINARY BLADDER: Primary | ICD-10-CM

## 2017-06-08 PROCEDURE — 96372 THER/PROPH/DIAG INJ SC/IM: CPT | Mod: PBBFAC

## 2017-06-08 PROCEDURE — 99999 PR PBB SHADOW E&M-EST. PATIENT-LVL III: CPT | Mod: PBBFAC,,,

## 2017-06-08 PROCEDURE — 51720 TREATMENT OF BLADDER LESION: CPT | Mod: PBBFAC

## 2017-06-08 PROCEDURE — 99213 OFFICE O/P EST LOW 20 MIN: CPT | Mod: PBBFAC

## 2017-06-08 PROCEDURE — 81002 URINALYSIS NONAUTO W/O SCOPE: CPT | Mod: PBBFAC

## 2017-06-08 PROCEDURE — 51720 TREATMENT OF BLADDER LESION: CPT | Mod: S$PBB,,, | Performed by: UROLOGY

## 2017-06-08 RX ADMIN — BACILLUS CALMETTE-GUERIN 50 MG: 50 POWDER, FOR SUSPENSION INTRAVESICAL at 04:06

## 2017-06-08 NOTE — PROGRESS NOTES
Name, , and allergies verified. Patient verbalized understanding of why they are here today. Patient is here for bladder instillation. Patient was instructed to empty bladder. Dipstick was negative. Patient was catheterized with a 10 fr catheter and medications administered as ordered. (see MAR) Patient tolerated procedure well.  Patient was asked to wait an appropriate 15 minutes in ensure that no reaction occurred. Patient was discharged with no acute distress and was instructed to call the office if any questions or concerns arose.

## 2017-06-09 LAB
BILIRUB SERPL-MCNC: NORMAL MG/DL
BLOOD URINE, POC: NORMAL
COLOR, POC UA: YELLOW
GLUCOSE UR QL STRIP: NORMAL
KETONES UR QL STRIP: NORMAL
LEUKOCYTE ESTERASE URINE, POC: NORMAL
NITRITE, POC UA: NORMAL
PH, POC UA: 5
PROTEIN, POC: NORMAL
SPECIFIC GRAVITY, POC UA: 1000
UROBILINOGEN, POC UA: NORMAL

## 2017-06-15 ENCOUNTER — CLINICAL SUPPORT (OUTPATIENT)
Dept: UROLOGY | Facility: CLINIC | Age: 82
End: 2017-06-15
Payer: MEDICARE

## 2017-06-15 VITALS — WEIGHT: 167.56 LBS | HEIGHT: 62 IN | BODY MASS INDEX: 30.83 KG/M2

## 2017-06-15 DIAGNOSIS — C67.2 MALIGNANT NEOPLASM OF LATERAL WALL OF URINARY BLADDER: Primary | ICD-10-CM

## 2017-06-15 PROCEDURE — 96372 THER/PROPH/DIAG INJ SC/IM: CPT | Mod: PBBFAC

## 2017-06-15 PROCEDURE — 51720 TREATMENT OF BLADDER LESION: CPT | Mod: S$PBB,,, | Performed by: UROLOGY

## 2017-06-15 PROCEDURE — 99213 OFFICE O/P EST LOW 20 MIN: CPT | Mod: PBBFAC

## 2017-06-15 PROCEDURE — 81002 URINALYSIS NONAUTO W/O SCOPE: CPT | Mod: PBBFAC

## 2017-06-15 PROCEDURE — 99999 PR PBB SHADOW E&M-EST. PATIENT-LVL III: CPT | Mod: PBBFAC,,,

## 2017-06-15 PROCEDURE — 51720 TREATMENT OF BLADDER LESION: CPT | Mod: PBBFAC

## 2017-06-15 RX ADMIN — BACILLUS CALMETTE-GUERIN 50 MG: 50 POWDER, FOR SUSPENSION INTRAVESICAL at 04:06

## 2017-06-15 NOTE — PROGRESS NOTES
Name, , and allergies verified. Patient verbalized understanding of why they are here today. Patient is here for BCG treatment. Patient was instructed to empty bladder. Patient was catheterized with a 10 fr catheter and medications administered as ordered. (see MAR) Patient tolerated procedure well. Instructions on after care given and patient verbalized understanding. Patient was asked to wait an appropriate 15 minutes in ensure that no reaction occurred. Patient was discharged with no acute distress and was instructed to call the office if any questions or concerns arose.

## 2017-06-16 LAB
BILIRUB SERPL-MCNC: NORMAL MG/DL
BLOOD URINE, POC: NORMAL
COLOR, POC UA: CLEAR
GLUCOSE UR QL STRIP: NORMAL
KETONES UR QL STRIP: NORMAL
LEUKOCYTE ESTERASE URINE, POC: NORMAL
NITRITE, POC UA: NORMAL
PH, POC UA: 5
PROTEIN, POC: NORMAL
SPECIFIC GRAVITY, POC UA: 1000
UROBILINOGEN, POC UA: NORMAL

## 2017-06-22 ENCOUNTER — CLINICAL SUPPORT (OUTPATIENT)
Dept: UROLOGY | Facility: CLINIC | Age: 82
End: 2017-06-22
Payer: MEDICARE

## 2017-06-22 VITALS — HEIGHT: 62 IN | BODY MASS INDEX: 30.83 KG/M2 | WEIGHT: 167.56 LBS

## 2017-06-22 DIAGNOSIS — C67.2 MALIGNANT NEOPLASM OF LATERAL WALL OF URINARY BLADDER: Primary | ICD-10-CM

## 2017-06-22 PROCEDURE — 51720 TREATMENT OF BLADDER LESION: CPT | Mod: PBBFAC

## 2017-06-22 PROCEDURE — 99213 OFFICE O/P EST LOW 20 MIN: CPT | Mod: PBBFAC

## 2017-06-22 PROCEDURE — 96372 THER/PROPH/DIAG INJ SC/IM: CPT | Mod: PBBFAC

## 2017-06-22 PROCEDURE — 99999 PR PBB SHADOW E&M-EST. PATIENT-LVL III: CPT | Mod: PBBFAC,,,

## 2017-06-22 PROCEDURE — 51720 TREATMENT OF BLADDER LESION: CPT | Mod: S$PBB,,, | Performed by: UROLOGY

## 2017-06-22 PROCEDURE — 81002 URINALYSIS NONAUTO W/O SCOPE: CPT | Mod: PBBFAC

## 2017-06-22 RX ADMIN — BACILLUS CALMETTE-GUERIN 50 MG: 50 POWDER, FOR SUSPENSION INTRAVESICAL at 05:06

## 2017-06-22 NOTE — PROGRESS NOTES
Name, , and allergies verified. Patient verbalized understanding of why they are here today. Patient is here for BCG treatment. Patient was instructed to empty bladder. Sample was taken from patient via clean catch. POCT dipstick was performed. See results. Patient was catheterized with a 10 fr catheter and medications administered as ordered. (see MAR) Patient tolerated procedure well. Instructions on after care given and patient verbalized understanding. Patient was asked to wait an appropriate 15 minutes in ensure that no reaction occurred. Patient was discharged with no acute distress and was instructed to call the office if any questions or concerns arose.

## 2017-06-23 LAB
BILIRUB SERPL-MCNC: NORMAL MG/DL
BLOOD URINE, POC: NORMAL
COLOR, POC UA: YELLOW
GLUCOSE UR QL STRIP: NORMAL
KETONES UR QL STRIP: NORMAL
LEUKOCYTE ESTERASE URINE, POC: NORMAL
NITRITE, POC UA: NORMAL
PH, POC UA: 6
PROTEIN, POC: NORMAL
SPECIFIC GRAVITY, POC UA: 1010
UROBILINOGEN, POC UA: NORMAL

## 2017-06-29 ENCOUNTER — CLINICAL SUPPORT (OUTPATIENT)
Dept: UROLOGY | Facility: CLINIC | Age: 82
End: 2017-06-29
Payer: MEDICARE

## 2017-06-29 ENCOUNTER — OFFICE VISIT (OUTPATIENT)
Dept: OBSTETRICS AND GYNECOLOGY | Facility: CLINIC | Age: 82
End: 2017-06-29
Payer: MEDICARE

## 2017-06-29 VITALS — BODY MASS INDEX: 30.83 KG/M2 | WEIGHT: 167.56 LBS | HEIGHT: 62 IN

## 2017-06-29 VITALS
HEIGHT: 62 IN | DIASTOLIC BLOOD PRESSURE: 79 MMHG | WEIGHT: 170.63 LBS | BODY MASS INDEX: 31.4 KG/M2 | SYSTOLIC BLOOD PRESSURE: 135 MMHG

## 2017-06-29 DIAGNOSIS — C67.2 MALIGNANT NEOPLASM OF LATERAL WALL OF URINARY BLADDER: Primary | ICD-10-CM

## 2017-06-29 DIAGNOSIS — N95.1 MENOPAUSAL STATE: Primary | ICD-10-CM

## 2017-06-29 DIAGNOSIS — C67.9 BLADDER CARCINOMA: ICD-10-CM

## 2017-06-29 DIAGNOSIS — Z90.710 STATUS POST HYSTERECTOMY: ICD-10-CM

## 2017-06-29 PROCEDURE — 81002 URINALYSIS NONAUTO W/O SCOPE: CPT | Mod: PBBFAC

## 2017-06-29 PROCEDURE — 99212 OFFICE O/P EST SF 10 MIN: CPT | Mod: S$PBB,,, | Performed by: OBSTETRICS & GYNECOLOGY

## 2017-06-29 PROCEDURE — 1159F MED LIST DOCD IN RCRD: CPT | Mod: ,,, | Performed by: OBSTETRICS & GYNECOLOGY

## 2017-06-29 PROCEDURE — 99213 OFFICE O/P EST LOW 20 MIN: CPT | Mod: PBBFAC,27

## 2017-06-29 PROCEDURE — 99999 PR PBB SHADOW E&M-EST. PATIENT-LVL III: CPT | Mod: PBBFAC,,, | Performed by: OBSTETRICS & GYNECOLOGY

## 2017-06-29 PROCEDURE — 99999 PR PBB SHADOW E&M-EST. PATIENT-LVL III: CPT | Mod: PBBFAC,,,

## 2017-06-29 PROCEDURE — 51720 TREATMENT OF BLADDER LESION: CPT | Mod: S$PBB,,, | Performed by: UROLOGY

## 2017-06-29 PROCEDURE — 96372 THER/PROPH/DIAG INJ SC/IM: CPT | Mod: PBBFAC

## 2017-06-29 PROCEDURE — 1126F AMNT PAIN NOTED NONE PRSNT: CPT | Mod: ,,, | Performed by: OBSTETRICS & GYNECOLOGY

## 2017-06-29 PROCEDURE — 51720 TREATMENT OF BLADDER LESION: CPT | Mod: PBBFAC

## 2017-06-29 RX ADMIN — BACILLUS CALMETTE-GUERIN 50 MG: 50 POWDER, FOR SUSPENSION INTRAVESICAL at 04:06

## 2017-06-29 NOTE — PROGRESS NOTES
CONSULT   UNDER   DR BAKER  CARE     81  YEAR    OLD     PAST   HX   NOTED   PT  WAS  DIAGNOSED   WITH  HEMATURIA   REFERRED  TO  DR BAKER   FOR  DX   PT  WAS  DX  WITH   CANCER   OF  URINARY  BLADDER  PT  DOING   WELL

## 2017-07-06 ENCOUNTER — CLINICAL SUPPORT (OUTPATIENT)
Dept: UROLOGY | Facility: CLINIC | Age: 82
End: 2017-07-06
Payer: MEDICARE

## 2017-07-06 VITALS — WEIGHT: 167.56 LBS | BODY MASS INDEX: 30.83 KG/M2 | HEIGHT: 62 IN

## 2017-07-06 DIAGNOSIS — N39.0 URINARY TRACT INFECTION, SITE NOT SPECIFIED: Primary | ICD-10-CM

## 2017-07-06 DIAGNOSIS — C67.2 MALIGNANT NEOPLASM OF LATERAL WALL OF URINARY BLADDER: Primary | ICD-10-CM

## 2017-07-06 LAB
BILIRUB SERPL-MCNC: NORMAL MG/DL
BLOOD URINE, POC: NORMAL
COLOR, POC UA: YELLOW
GLUCOSE UR QL STRIP: NORMAL
KETONES UR QL STRIP: NORMAL
LEUKOCYTE ESTERASE URINE, POC: NORMAL
NITRITE, POC UA: NORMAL
PH, POC UA: 6
PROTEIN, POC: NORMAL
SPECIFIC GRAVITY, POC UA: 1005
UROBILINOGEN, POC UA: NORMAL

## 2017-07-06 PROCEDURE — 99999 PR PBB SHADOW E&M-EST. PATIENT-LVL III: CPT | Mod: PBBFAC,,,

## 2017-07-06 PROCEDURE — 99213 OFFICE O/P EST LOW 20 MIN: CPT | Mod: PBBFAC

## 2017-07-06 PROCEDURE — 51720 TREATMENT OF BLADDER LESION: CPT | Mod: S$PBB,,, | Performed by: UROLOGY

## 2017-07-06 PROCEDURE — 51720 TREATMENT OF BLADDER LESION: CPT | Mod: PBBFAC

## 2017-07-06 PROCEDURE — 81002 URINALYSIS NONAUTO W/O SCOPE: CPT | Mod: PBBFAC | Performed by: UROLOGY

## 2017-07-06 RX ADMIN — BACILLUS CALMETTE-GUERIN 50 MG: 50 POWDER, FOR SUSPENSION INTRAVESICAL at 04:07

## 2017-07-20 ENCOUNTER — PROCEDURE VISIT (OUTPATIENT)
Dept: UROLOGY | Facility: CLINIC | Age: 82
End: 2017-07-20
Payer: MEDICARE

## 2017-07-20 VITALS — BODY MASS INDEX: 31 KG/M2 | WEIGHT: 168.44 LBS | HEIGHT: 62 IN

## 2017-07-20 DIAGNOSIS — C67.2 MALIGNANT NEOPLASM OF LATERAL WALL OF URINARY BLADDER: ICD-10-CM

## 2017-07-20 PROCEDURE — 81002 URINALYSIS NONAUTO W/O SCOPE: CPT | Mod: PBBFAC | Performed by: UROLOGY

## 2017-07-20 PROCEDURE — 52000 CYSTOURETHROSCOPY: CPT | Mod: PBBFAC | Performed by: UROLOGY

## 2017-07-20 NOTE — PROCEDURES
"Cystoscopy  Date/Time: 7/20/2017 11:42 AM  Performed by: LESLY BAKER  Authorized by: LESLY BAKER     Consent Done?:  Yes (Written)  Time out: Immediately prior to procedure a "time out" was called to verify the correct patient, procedure, equipment, support staff and site/side marked as required.    Indications: history bladder cancer    Position:  Dorsal lithotomy  Anesthesia:  Lidocaine jelly  Patient sedated?: No    Preparation: Patient was prepped and draped in usual sterile fashion      Scope type:  Flexible cystoscope  Stent inserted: No    Stent removed: No    External exam normal: Yes    Digital exam performed: No    Urethra normal: Yes  Bladder neck normal: Bladder neck normal   Bladder normal: No (Previous resection sites in L lateral and R posterior noted with stone formation on scar. No definitive tumor growth below stone. No new tumors.)      Patient tolerance:  Patient tolerated the procedure well with no immediate complications     No tumors noted in bladder. BCG in 3 months x 3 weeks then cystoscopy.      "

## 2017-09-08 ENCOUNTER — TELEPHONE (OUTPATIENT)
Dept: UROLOGY | Facility: CLINIC | Age: 82
End: 2017-09-08

## 2017-09-08 NOTE — TELEPHONE ENCOUNTER
----- Message from Yuko Vega sent at 9/8/2017  2:00 PM CDT -----  Contact: [phone]  Pt is needing to change her appointments with Olga Yates to another day pt said she cant do wednesdays fridays or good days Please call pt at your earliest convenience.  Thanks !

## 2017-10-05 ENCOUNTER — CLINICAL SUPPORT (OUTPATIENT)
Dept: UROLOGY | Facility: CLINIC | Age: 82
End: 2017-10-05
Payer: MEDICARE

## 2017-10-05 VITALS — WEIGHT: 167.56 LBS | BODY MASS INDEX: 30.83 KG/M2 | RESPIRATION RATE: 14 BRPM | HEIGHT: 62 IN

## 2017-10-05 DIAGNOSIS — C67.2 MALIGNANT NEOPLASM OF LATERAL WALL OF URINARY BLADDER: Primary | ICD-10-CM

## 2017-10-05 LAB
BILIRUB SERPL-MCNC: NORMAL MG/DL
BLOOD URINE, POC: NORMAL
COLOR, POC UA: YELLOW
GLUCOSE UR QL STRIP: NORMAL
KETONES UR QL STRIP: NORMAL
LEUKOCYTE ESTERASE URINE, POC: NORMAL
NITRITE, POC UA: NORMAL
PH, POC UA: 5
PROTEIN, POC: NORMAL
SPECIFIC GRAVITY, POC UA: CLEAR
UROBILINOGEN, POC UA: NORMAL

## 2017-10-05 PROCEDURE — 96372 THER/PROPH/DIAG INJ SC/IM: CPT | Mod: PBBFAC

## 2017-10-05 PROCEDURE — 99213 OFFICE O/P EST LOW 20 MIN: CPT | Mod: PBBFAC | Performed by: NURSE PRACTITIONER

## 2017-10-05 PROCEDURE — 51720 TREATMENT OF BLADDER LESION: CPT | Mod: S$PBB,,, | Performed by: NURSE PRACTITIONER

## 2017-10-05 PROCEDURE — 99999 PR PBB SHADOW E&M-EST. PATIENT-LVL III: CPT | Mod: PBBFAC,,, | Performed by: NURSE PRACTITIONER

## 2017-10-05 PROCEDURE — 81002 URINALYSIS NONAUTO W/O SCOPE: CPT | Mod: PBBFAC | Performed by: NURSE PRACTITIONER

## 2017-10-05 PROCEDURE — 51720 TREATMENT OF BLADDER LESION: CPT | Mod: PBBFAC

## 2017-10-05 RX ADMIN — BACILLUS CALMETTE-GUERIN 50 MG: 50 POWDER, FOR SUSPENSION INTRAVESICAL at 04:10

## 2017-10-13 ENCOUNTER — CLINICAL SUPPORT (OUTPATIENT)
Dept: UROLOGY | Facility: CLINIC | Age: 82
End: 2017-10-13
Payer: MEDICARE

## 2017-10-13 VITALS — HEIGHT: 62 IN | BODY MASS INDEX: 31.77 KG/M2 | WEIGHT: 172.63 LBS | RESPIRATION RATE: 14 BRPM

## 2017-10-13 DIAGNOSIS — C67.2 MALIGNANT NEOPLASM OF LATERAL WALL OF URINARY BLADDER: Primary | ICD-10-CM

## 2017-10-13 PROCEDURE — 99213 OFFICE O/P EST LOW 20 MIN: CPT | Mod: PBBFAC | Performed by: NURSE PRACTITIONER

## 2017-10-13 PROCEDURE — 51720 TREATMENT OF BLADDER LESION: CPT | Mod: PBBFAC

## 2017-10-13 PROCEDURE — 96372 THER/PROPH/DIAG INJ SC/IM: CPT | Mod: PBBFAC

## 2017-10-13 PROCEDURE — 51720 TREATMENT OF BLADDER LESION: CPT | Mod: S$PBB,,, | Performed by: NURSE PRACTITIONER

## 2017-10-13 PROCEDURE — 99999 PR PBB SHADOW E&M-EST. PATIENT-LVL III: CPT | Mod: PBBFAC,,, | Performed by: NURSE PRACTITIONER

## 2017-10-13 RX ADMIN — BACILLUS CALMETTE-GUERIN 50 MG: 50 POWDER, FOR SUSPENSION INTRAVESICAL at 03:10

## 2017-10-20 ENCOUNTER — CLINICAL SUPPORT (OUTPATIENT)
Dept: UROLOGY | Facility: CLINIC | Age: 82
End: 2017-10-20
Payer: MEDICARE

## 2017-10-20 DIAGNOSIS — C67.2 MALIGNANT NEOPLASM OF LATERAL WALL OF URINARY BLADDER: Primary | ICD-10-CM

## 2017-10-20 LAB
BILIRUB SERPL-MCNC: NORMAL MG/DL
BLOOD URINE, POC: NORMAL
COLOR, POC UA: NORMAL
GLUCOSE UR QL STRIP: NORMAL
KETONES UR QL STRIP: NORMAL
LEUKOCYTE ESTERASE URINE, POC: NORMAL
NITRITE, POC UA: NORMAL
PH, POC UA: 6
PROTEIN, POC: NORMAL
SPECIFIC GRAVITY, POC UA: 1000
UROBILINOGEN, POC UA: NORMAL

## 2017-10-20 PROCEDURE — 96372 THER/PROPH/DIAG INJ SC/IM: CPT | Mod: PBBFAC

## 2017-10-20 PROCEDURE — 99999 PR PBB SHADOW E&M-EST. PATIENT-LVL III: CPT | Mod: PBBFAC,,, | Performed by: NURSE PRACTITIONER

## 2017-10-20 PROCEDURE — 51720 TREATMENT OF BLADDER LESION: CPT | Mod: S$PBB,,, | Performed by: NURSE PRACTITIONER

## 2017-10-20 PROCEDURE — 51720 TREATMENT OF BLADDER LESION: CPT | Mod: PBBFAC

## 2017-10-20 PROCEDURE — 81002 URINALYSIS NONAUTO W/O SCOPE: CPT | Mod: PBBFAC | Performed by: NURSE PRACTITIONER

## 2017-10-20 PROCEDURE — 99213 OFFICE O/P EST LOW 20 MIN: CPT | Mod: PBBFAC | Performed by: NURSE PRACTITIONER

## 2017-10-20 RX ADMIN — BACILLUS CALMETTE-GUERIN 50 MG: 50 POWDER, FOR SUSPENSION INTRAVESICAL at 11:10

## 2017-11-06 ENCOUNTER — TELEPHONE (OUTPATIENT)
Dept: UROLOGY | Facility: CLINIC | Age: 82
End: 2017-11-06

## 2017-11-06 NOTE — TELEPHONE ENCOUNTER
----- Message from Cherie Bangura sent at 11/6/2017  9:13 AM CST -----  Contact: self  Pt would like to reschedule her appt on Thursday. Please contact her at [phone].    Thanks

## 2017-11-06 NOTE — TELEPHONE ENCOUNTER
Patient rescheduled due to having to be CG for friends post-op- patient requested a Thursday or Friday- given first Thursday available.

## 2017-12-15 ENCOUNTER — OFFICE VISIT (OUTPATIENT)
Dept: FAMILY MEDICINE | Facility: CLINIC | Age: 82
End: 2017-12-15
Payer: MEDICARE

## 2017-12-15 VITALS
RESPIRATION RATE: 16 BRPM | DIASTOLIC BLOOD PRESSURE: 90 MMHG | HEIGHT: 64 IN | WEIGHT: 177 LBS | SYSTOLIC BLOOD PRESSURE: 140 MMHG | HEART RATE: 79 BPM | TEMPERATURE: 99 F | OXYGEN SATURATION: 98 % | BODY MASS INDEX: 30.22 KG/M2

## 2017-12-15 DIAGNOSIS — C67.2 MALIGNANT NEOPLASM OF LATERAL WALL OF URINARY BLADDER: ICD-10-CM

## 2017-12-15 DIAGNOSIS — B96.89 ACUTE BACTERIAL BRONCHITIS: Primary | ICD-10-CM

## 2017-12-15 DIAGNOSIS — J20.8 ACUTE BACTERIAL BRONCHITIS: Primary | ICD-10-CM

## 2017-12-15 PROCEDURE — 99215 OFFICE O/P EST HI 40 MIN: CPT | Mod: PBBFAC,PO | Performed by: PHYSICIAN ASSISTANT

## 2017-12-15 PROCEDURE — 99999 PR PBB SHADOW E&M-EST. PATIENT-LVL V: CPT | Mod: PBBFAC,,, | Performed by: PHYSICIAN ASSISTANT

## 2017-12-15 PROCEDURE — 99214 OFFICE O/P EST MOD 30 MIN: CPT | Mod: S$PBB,,, | Performed by: PHYSICIAN ASSISTANT

## 2017-12-15 RX ORDER — AMOXICILLIN 500 MG/1
500 TABLET, FILM COATED ORAL EVERY 12 HOURS
Qty: 14 TABLET | Refills: 0 | Status: SHIPPED | OUTPATIENT
Start: 2017-12-15 | End: 2017-12-22

## 2017-12-15 NOTE — PROGRESS NOTES
Subjective:       Patient ID: Ella Sarmiento is a 82 y.o. female with multiple medical diagnoses as listed in the medical history and problem list that presents for URI (x 1 week)  .    Chief Complaint: URI (x 1 week)      URI    This is a new problem. The current episode started in the past 7 days. The problem has been gradually worsening. Associated symptoms include congestion, coughing, ear pain and rhinorrhea. Pertinent negatives include no abdominal pain, diarrhea, headaches, nausea, sinus pain, sneezing, sore throat, vomiting or wheezing. Treatments tried: zrytec a couple times and benadryl at night      Review of Systems   Constitutional: Negative for chills and fever.   HENT: Positive for congestion, ear pain, postnasal drip, rhinorrhea and sinus pressure. Negative for sinus pain, sneezing and sore throat.    Eyes: Negative for pain, discharge, redness and itching.        Dry    Respiratory: Positive for cough. Negative for chest tightness, shortness of breath and wheezing.    Gastrointestinal: Negative for abdominal pain, diarrhea, nausea and vomiting.   Neurological: Negative for headaches.         PAST MEDICAL HISTORY:  Past Medical History:   Diagnosis Date    Arthritis     Thyroid disease     Vaginal delivery     x4       SOCIAL HISTORY:  Social History     Social History    Marital status: Single     Spouse name: N/A    Number of children: N/A    Years of education: N/A     Occupational History    Not on file.     Social History Main Topics    Smoking status: Never Smoker    Smokeless tobacco: Never Used    Alcohol use Yes      Comment: rarely    Drug use: No    Sexual activity: No     Other Topics Concern    Not on file     Social History Narrative    No narrative on file       ALLERGIES AND MEDICATIONS: updated and reviewed.  Review of patient's allergies indicates:   Allergen Reactions    Codeine      Patient can't remember    Keflex [cephalexin]      Current Outpatient  "Prescriptions   Medication Sig Dispense Refill    aspirin (ECOTRIN) 81 MG EC tablet Take 81 mg by mouth once daily.      azelaic acid (FINACEA) 15 % cream Apply topically 2 (two) times daily.      cetirizine (ZYRTEC) 10 MG tablet Take 10 mg by mouth once daily.      cyanocobalamin (B-12 DOTS) 500 MCG tablet Take 500 mcg by mouth once daily.      DIPHENHYDRAMINE HCL (BENADRYL ALLERGY ORAL) Take 12.5 mg by mouth daily as needed.       FIBER CHOICE ORAL Take 1 tablet by mouth daily as needed.       hydrOXYzine HCl (ATARAX) 25 MG tablet Take 25 mg by mouth 4 (four) times daily as needed for Itching.      L GASSERI/B BIFIDUM/B LONGUM ([employer] ORAL) Take 1 capsule by mouth daily as needed.       levothyroxine (SYNTHROID) 100 MCG tablet       multivit-mineral-iron-lutein (CENTRUM SILVER ULTRA WOMEN'S) Tab Take 1 tablet by mouth daily as needed.       vitamin E 600 UNIT capsule Take 600 Units by mouth once daily.      amoxicillin (AMOXIL) 500 MG Tab Take 1 tablet (500 mg total) by mouth every 12 (twelve) hours. 14 tablet 0     Current Facility-Administered Medications   Medication Dose Route Frequency Provider Last Rate Last Dose    estradiol valerate (DELESTROGEN) injection 20 mg/mL  20 mg Intramuscular Q30 Days Taqueria Cooper MD   20 mg at 11/04/15 1421    estradiol valerate injection 20 mg  20 mg Intramuscular Q21 Days Taqueria Cooper MD   20 mg at 07/16/15 1634    estradiol valerate injection 20 mg  20 mg Intramuscular Q21 Days Taqueria Cooper MD   20 mg at 02/11/15 1434         Objective:   BP (!) 140/90   Pulse 79   Temp 98.5 °F (36.9 °C) (Oral)   Resp 16   Ht 5' 4" (1.626 m)   Wt 80.3 kg (177 lb 0.5 oz)   SpO2 98%   BMI 30.39 kg/m²      Physical Exam   Constitutional: She is oriented to person, place, and time. No distress.   HENT:   Head: Normocephalic and atraumatic.   Right Ear: External ear and ear canal normal. Tympanic membrane is not injected and not scarred.   Left Ear: " External ear and ear canal normal. Tympanic membrane is not injected and not scarred.   Nose: Mucosal edema and rhinorrhea present. Right sinus exhibits no maxillary sinus tenderness and no frontal sinus tenderness. Left sinus exhibits no maxillary sinus tenderness and no frontal sinus tenderness.   Mouth/Throat: Uvula is midline and mucous membranes are normal. No oropharyngeal exudate.   PND   Eyes: Conjunctivae and EOM are normal.   Cardiovascular: Normal rate and regular rhythm.    Pulmonary/Chest: Effort normal and breath sounds normal. She has no wheezes.   Negative egophony    Lymphadenopathy:     She has no cervical adenopathy.   Neurological: She is alert and oriented to person, place, and time.   Skin: Skin is warm. No erythema.           Assessment:       1. Acute bacterial bronchitis    2. Malignant neoplasm of lateral wall of urinary bladder        Plan:       Acute bacterial bronchitis  -     amoxicillin (AMOXIL) 500 MG Tab; Take 1 tablet (500 mg total) by mouth every 12 (twelve) hours.  Dispense: 14 tablet; Refill: 0    Malignant neoplasm of lateral wall of urinary bladder    switch to claritin        No Follow-up on file.

## 2017-12-21 ENCOUNTER — PROCEDURE VISIT (OUTPATIENT)
Dept: UROLOGY | Facility: CLINIC | Age: 82
End: 2017-12-21
Payer: MEDICARE

## 2017-12-21 VITALS
BODY MASS INDEX: 30.22 KG/M2 | SYSTOLIC BLOOD PRESSURE: 138 MMHG | WEIGHT: 177 LBS | HEART RATE: 70 BPM | HEIGHT: 64 IN | RESPIRATION RATE: 16 BRPM | DIASTOLIC BLOOD PRESSURE: 70 MMHG

## 2017-12-21 DIAGNOSIS — C67.2 MALIGNANT NEOPLASM OF LATERAL WALL OF URINARY BLADDER: ICD-10-CM

## 2017-12-21 PROCEDURE — 52000 CYSTOURETHROSCOPY: CPT | Mod: PBBFAC | Performed by: UROLOGY

## 2017-12-21 NOTE — PROCEDURES
"Cystoscopy  Date/Time: 12/21/2017 3:37 PM  Performed by: LESLY BAKER  Authorized by: LESLY BAKER     Consent Done?:  Yes (Written)  Time out: Immediately prior to procedure a "time out" was called to verify the correct patient, procedure, equipment, support staff and site/side marked as required.    Indications: history bladder cancer    Position:  Dorsal lithotomy  Anesthesia:  Lidocaine jelly  Patient sedated?: No    Preparation: Patient was prepped and draped in usual sterile fashion      Scope type:  Flexible cystoscope  Stent inserted: No    Stent removed: No    External exam normal: Yes    Digital exam performed: No    Urethra normal: Yes  Bladder neck normal: Bladder neck normal   Bladder normal: No (Prior resection sites noted with stone formation. No new tumors.)      Patient tolerance:  Patient tolerated the procedure well with no immediate complications     Cystoscopy in 3 months. BCG maintenance in 6 months. CT urogram 3 months (annual).        "

## 2018-01-03 ENCOUNTER — OFFICE VISIT (OUTPATIENT)
Dept: OBSTETRICS AND GYNECOLOGY | Facility: CLINIC | Age: 83
End: 2018-01-03
Payer: MEDICARE

## 2018-01-03 VITALS
SYSTOLIC BLOOD PRESSURE: 138 MMHG | BODY MASS INDEX: 29.99 KG/M2 | WEIGHT: 175.69 LBS | HEIGHT: 64 IN | DIASTOLIC BLOOD PRESSURE: 77 MMHG

## 2018-01-03 DIAGNOSIS — R92.30 DENSE BREAST TISSUE: ICD-10-CM

## 2018-01-03 DIAGNOSIS — N95.2 ATROPHIC VAGINITIS: ICD-10-CM

## 2018-01-03 DIAGNOSIS — Z00.00 ANNUAL PHYSICAL EXAM: Primary | ICD-10-CM

## 2018-01-03 DIAGNOSIS — Z90.710 STATUS POST HYSTERECTOMY: ICD-10-CM

## 2018-01-03 DIAGNOSIS — N95.1 MENOPAUSAL STATE: ICD-10-CM

## 2018-01-03 PROCEDURE — 99999 PR PBB SHADOW E&M-EST. PATIENT-LVL IV: CPT | Mod: PBBFAC,,, | Performed by: OBSTETRICS & GYNECOLOGY

## 2018-01-03 PROCEDURE — 99214 OFFICE O/P EST MOD 30 MIN: CPT | Mod: PBBFAC | Performed by: OBSTETRICS & GYNECOLOGY

## 2018-01-03 PROCEDURE — G0101 CA SCREEN;PELVIC/BREAST EXAM: HCPCS | Mod: S$PBB,,, | Performed by: OBSTETRICS & GYNECOLOGY

## 2018-01-03 NOTE — PROGRESS NOTES
Subjective:      Chief Complaint:    Chief Complaint   Patient presents with    Gynecologic Exam       Menstrual History:    OB History      Para Term  AB Living    4         3    SAB TAB Ectopic Multiple Live Births                       Menarche age: 14     No LMP recorded. Patient has had a hysterectomy.            Objective:        History of Present Illness AND  Examination detailed DICTATE:         HISTORY OF PRESENT ILLNESS:  The patient is 82 years of age.  Here for annual   examination,  4, para 3.  Medical history, arthritis, hypothyroidism.    The patient had abnormal mammogram, indicating dense breast tissue.  Surgical   history, hysterectomy, BSO, knee surgery, bladder cancer and resection of   cancer, colonoscopy.  The patient discontinued estrogen therapy, has no real   problem or complaint, is being followed by Dr. Falk for her bladder cancer.    REVIEW OF SYSTEMS:  HEAD, EAR, EYES, NOSE, AND THROAT:  Negative.  CARDIORESPIRATORY:  Negative.  GASTROINTESTINAL:  Negative.  GENITOURINARY:  See present illness.  NEUROMUSCULAR:  No problem or complaint.    PHYSICAL EXAMINATION:  VITAL SIGNS:  Blood pressure 138/77, weight 175.  HEAD:  Normocephalic.  EYES:  Reactive.  NECK:  Supple.  Thyroid is not palpable.  No nodes.  CHEST:  Clear.  HEART:  Regular sinus rhythm, no murmur.  BREASTS:  Fibrous dense breast tissue.  No discharge, skin changes, retraction,   nipple changes.  Axilla negative.  ABDOMEN:  Upper abdomen normal.  Lower abdomen normal.  No guarding, rebound or   tenderness.  PELVIC:  External normal.  Vulva normal.  [place], urethral and [place]'s   negative.  Vagina, thin, atrophic, decreased rugal pattern.  Cervix, uterus and   adnexa are absent.  Reasonably good apical support.  Bladder base is negative.  RECTAL:  External negative.  MUSCULOSKELETAL:  Negative.  NEUROLOGIC:  Grossly normal.  SKIN:  Clear.    IMPRESSION:  Menopausal, post hysterectomy.    PLAN:  We  will repeat the mammogram because of the dense breast tissue.  No   family history of breast cancer.  Continue with calcium, vitamin D and   multivitamins and also we will do a bone density on the patient and we will see   her back within a year.      JESSICA  dd: 01/03/2018 10:41:20 (CST)  td: 01/04/2018 04:30:31 (CST)  Doc ID   #[id number]  Job ID #[id number]    CC:         Assessment:      Diagnosis:ANNUAL  EXAM   GYN   EXAM   MENOPAUSAL       Plan:      Return in 12  months

## 2018-01-03 NOTE — PATIENT INSTRUCTIONS
.  Today's Plan:  Refer to your After Visit Summary for more information on your visit.       Talking to Your Healthcare Provider: Play an Active Role  To get the most out of your healthcare, take an active role. This means thinking of every healthcare provider (HCP) as a partner in your care. Below are things you can do to help that partnership go smoothly.    Tell the truth.  To get the best possible care, you need to be honest with your healthcare provider.      Dont be afraid to talk about sensitive subjects.  Keep in mind that your HCP is used to talking about personal matters.    Stay focused.  Try not to think about what your HCP said 5 minutes ago or what you will say next.    Take notes.  When you write down what you hear, you listen more carefully. Dont write down every word your HCP says, just the main points.    Look at your HCP.  When you make eye contact, you show you are listening and you get more out of the discussion.    Repeat back.  Say something like What I hear you saying is... This shows you are listening and understanding, and gives your HCP a chance to correct any mistakes.     Keep all your appointments.  If you cant make it, call as soon as you know.    Be on time.  When a patient is late for an appointment, it can throw the HCP and his or her staff off schedule.    If you are having a medical test, follow your HCPs instructions.  For some medical tests, you need to fast (not eat or drink). For others, you need to take medication.    Do what your HCP asks.  Your HCP may give you instructions concerning things such as medication use, diet, or exercise.    If you cant follow your HCPs instructions, be sure to speak up.  Whether your medication makes you sick or you cant give up junk food, let your HCP know. Together, you may be able to solve the problem.    Talk about over-the-counter drugs.  If you are receiving treatment, ask your HCP which over-the-counter drugs you can  take.    Take prescription drugs as instructed.  Dont take more or less than your doctor prescribed. Dont stop taking your medication without talking to your doctor first.    Make sure your primary doctor is kept up-to-date.  If you are seeing a specialist, ask the specialist to let your primary doctor know how you are doing.    Ask your HCP how to contact him or her.  Find out if there are certain times during the day when your HCP takes phone calls. Ask who to call if you need an answer right away.    © 1521-7353 [facility] [place], [address] [place] [address], [place] [address]. All rights reserved. This information is not intended as a substitute for professional medical care. Always follow your healthcare professional's instructions.      Thank you for your visit today. Please call our office if you can NOT make your future appointment.    If you are a smoker and would like to quit smoking and need help please call [phone] ([phone])

## 2018-01-08 ENCOUNTER — TELEPHONE (OUTPATIENT)
Dept: OBSTETRICS AND GYNECOLOGY | Facility: CLINIC | Age: 83
End: 2018-01-08

## 2018-01-08 ENCOUNTER — HOSPITAL ENCOUNTER (OUTPATIENT)
Dept: RADIOLOGY | Facility: HOSPITAL | Age: 83
Discharge: HOME | End: 2018-01-08
Attending: OBSTETRICS & GYNECOLOGY
Payer: MEDICARE

## 2018-01-08 DIAGNOSIS — N63.0 BREAST LUMP IN FEMALE: ICD-10-CM

## 2018-01-08 DIAGNOSIS — R92.30 DENSE BREAST TISSUE: ICD-10-CM

## 2018-01-08 DIAGNOSIS — N64.4 BREAST PAIN IN FEMALE: ICD-10-CM

## 2018-01-08 PROCEDURE — 76642 ULTRASOUND BREAST LIMITED: CPT | Mod: 26,LT,, | Performed by: RADIOLOGY

## 2018-01-08 PROCEDURE — 77066 DX MAMMO INCL CAD BI: CPT | Mod: 26,,, | Performed by: RADIOLOGY

## 2018-01-08 PROCEDURE — 77066 DX MAMMO INCL CAD BI: CPT | Mod: TC

## 2018-01-08 PROCEDURE — 76642 ULTRASOUND BREAST LIMITED: CPT | Mod: TC,LT

## 2018-01-08 PROCEDURE — 77062 BREAST TOMOSYNTHESIS BI: CPT | Mod: 26,,, | Performed by: RADIOLOGY

## 2018-01-08 NOTE — TELEPHONE ENCOUNTER
Notes Recorded by Alecia Coats MA on 1/8/2018 at 4:18 PM CST  Spoke with patient in regards to normal results. Patient expressed understanding.

## 2018-01-08 NOTE — TELEPHONE ENCOUNTER
----- Message from Taqueria Cooper MD sent at 1/8/2018  3:54 PM CST -----  Your MAMMOGRAM is normal

## 2018-01-18 ENCOUNTER — CLINICAL SUPPORT (OUTPATIENT)
Dept: UROLOGY | Facility: CLINIC | Age: 83
End: 2018-01-18
Payer: MEDICARE

## 2018-01-18 VITALS — BODY MASS INDEX: 29.99 KG/M2 | WEIGHT: 175.69 LBS | RESPIRATION RATE: 14 BRPM | HEIGHT: 64 IN

## 2018-01-18 DIAGNOSIS — C67.9 MALIGNANT NEOPLASM OF URINARY BLADDER, UNSPECIFIED SITE: Primary | ICD-10-CM

## 2018-01-18 PROCEDURE — 99213 OFFICE O/P EST LOW 20 MIN: CPT | Mod: PBBFAC | Performed by: NURSE PRACTITIONER

## 2018-01-18 PROCEDURE — 51720 TREATMENT OF BLADDER LESION: CPT | Mod: PBBFAC

## 2018-01-18 PROCEDURE — 96372 THER/PROPH/DIAG INJ SC/IM: CPT | Mod: PBBFAC

## 2018-01-18 PROCEDURE — 99999 PR PBB SHADOW E&M-EST. PATIENT-LVL III: CPT | Mod: PBBFAC,,, | Performed by: NURSE PRACTITIONER

## 2018-01-18 PROCEDURE — 51720 TREATMENT OF BLADDER LESION: CPT | Mod: S$PBB,,, | Performed by: NURSE PRACTITIONER

## 2018-01-18 RX ADMIN — BACILLUS CALMETTE-GUERIN 50 MG: 50 POWDER, FOR SUSPENSION INTRAVESICAL at 03:01

## 2018-01-25 ENCOUNTER — CLINICAL SUPPORT (OUTPATIENT)
Dept: UROLOGY | Facility: CLINIC | Age: 83
End: 2018-01-25
Payer: MEDICARE

## 2018-01-25 DIAGNOSIS — C67.2 MALIGNANT NEOPLASM OF LATERAL WALL OF URINARY BLADDER: Primary | ICD-10-CM

## 2018-01-25 PROCEDURE — 96372 THER/PROPH/DIAG INJ SC/IM: CPT | Mod: PBBFAC

## 2018-01-25 PROCEDURE — 51720 TREATMENT OF BLADDER LESION: CPT | Mod: S$PBB,,, | Performed by: NURSE PRACTITIONER

## 2018-01-25 PROCEDURE — 51720 TREATMENT OF BLADDER LESION: CPT | Mod: PBBFAC

## 2018-01-25 RX ADMIN — BACILLUS CALMETTE-GUERIN 50 MG: 50 POWDER, FOR SUSPENSION INTRAVESICAL at 02:01

## 2018-02-01 ENCOUNTER — CLINICAL SUPPORT (OUTPATIENT)
Dept: UROLOGY | Facility: CLINIC | Age: 83
End: 2018-02-01
Payer: MEDICARE

## 2018-02-01 VITALS — BODY MASS INDEX: 30.04 KG/M2 | RESPIRATION RATE: 16 BRPM | HEIGHT: 64 IN | WEIGHT: 175.94 LBS

## 2018-02-01 DIAGNOSIS — C67.2 MALIGNANT NEOPLASM OF LATERAL WALL OF URINARY BLADDER: Primary | ICD-10-CM

## 2018-02-01 LAB
BILIRUB SERPL-MCNC: NORMAL MG/DL
BLOOD URINE, POC: NORMAL
COLOR, POC UA: NORMAL
GLUCOSE UR QL STRIP: NORMAL
KETONES UR QL STRIP: NORMAL
LEUKOCYTE ESTERASE URINE, POC: NORMAL
NITRITE, POC UA: NORMAL
PH, POC UA: 5
PROTEIN, POC: NORMAL
SPECIFIC GRAVITY, POC UA: 1000
UROBILINOGEN, POC UA: NORMAL

## 2018-02-01 PROCEDURE — 99999 PR PBB SHADOW E&M-EST. PATIENT-LVL III: CPT | Mod: PBBFAC,,, | Performed by: NURSE PRACTITIONER

## 2018-02-01 PROCEDURE — 96372 THER/PROPH/DIAG INJ SC/IM: CPT | Mod: JG,PBBFAC

## 2018-02-01 PROCEDURE — 81002 URINALYSIS NONAUTO W/O SCOPE: CPT | Mod: PBBFAC | Performed by: NURSE PRACTITIONER

## 2018-02-01 PROCEDURE — 99213 OFFICE O/P EST LOW 20 MIN: CPT | Mod: PBBFAC,25 | Performed by: NURSE PRACTITIONER

## 2018-02-01 PROCEDURE — 51720 TREATMENT OF BLADDER LESION: CPT | Mod: PBBFAC

## 2018-02-01 PROCEDURE — 51720 TREATMENT OF BLADDER LESION: CPT | Mod: S$PBB,,, | Performed by: NURSE PRACTITIONER

## 2018-02-01 RX ADMIN — BACILLUS CALMETTE-GUERIN 50 MG: 50 POWDER, FOR SUSPENSION INTRAVESICAL at 01:02

## 2018-03-06 DIAGNOSIS — C67.2 MALIGNANT NEOPLASM OF LATERAL WALL OF URINARY BLADDER: Primary | ICD-10-CM

## 2018-03-09 ENCOUNTER — HOSPITAL ENCOUNTER (OUTPATIENT)
Dept: RADIOLOGY | Facility: HOSPITAL | Age: 83
Discharge: HOME | End: 2018-03-09
Attending: UROLOGY
Payer: MEDICARE

## 2018-03-09 DIAGNOSIS — C67.2 MALIGNANT NEOPLASM OF LATERAL WALL OF URINARY BLADDER: ICD-10-CM

## 2018-03-09 PROCEDURE — 74178 CT ABD&PLV WO CNTR FLWD CNTR: CPT | Mod: 26,,, | Performed by: RADIOLOGY

## 2018-03-09 PROCEDURE — 25500020 PHARM REV CODE 255: Performed by: UROLOGY

## 2018-03-09 PROCEDURE — 74178 CT ABD&PLV WO CNTR FLWD CNTR: CPT | Mod: TC

## 2018-03-09 RX ADMIN — IOHEXOL 125 ML: 350 INJECTION, SOLUTION INTRAVENOUS at 09:03

## 2018-03-29 ENCOUNTER — PROCEDURE VISIT (OUTPATIENT)
Dept: UROLOGY | Facility: CLINIC | Age: 83
End: 2018-03-29
Payer: MEDICARE

## 2018-03-29 VITALS — RESPIRATION RATE: 14 BRPM | BODY MASS INDEX: 30.04 KG/M2 | HEART RATE: 60 BPM | HEIGHT: 64 IN | WEIGHT: 175.94 LBS

## 2018-03-29 DIAGNOSIS — C67.2 MALIGNANT NEOPLASM OF LATERAL WALL OF URINARY BLADDER: ICD-10-CM

## 2018-03-29 PROCEDURE — 52000 CYSTOURETHROSCOPY: CPT | Mod: PBBFAC | Performed by: UROLOGY

## 2018-03-29 RX ORDER — PNEUMOCOCCAL 13-VALENT CONJUGATE VACCINE 2.2; 2.2; 2.2; 2.2; 2.2; 4.4; 2.2; 2.2; 2.2; 2.2; 2.2; 2.2; 2.2 UG/.5ML; UG/.5ML; UG/.5ML; UG/.5ML; UG/.5ML; UG/.5ML; UG/.5ML; UG/.5ML; UG/.5ML; UG/.5ML; UG/.5ML; UG/.5ML; UG/.5ML
INJECTION, SUSPENSION INTRAMUSCULAR
COMMUNITY
Start: 2018-03-05 | End: 2018-04-04 | Stop reason: ALTCHOICE

## 2018-03-29 RX ORDER — CIPROFLOXACIN 2 MG/ML
400 INJECTION, SOLUTION INTRAVENOUS
Status: CANCELLED | OUTPATIENT
Start: 2018-03-29

## 2018-03-29 NOTE — H&P
"  Subjective:       Ella Sarmiento is a 81 y.o. female who is an established patient who was referred by Dr Cooper for evaluation of hematuria.      Hematuria  Patient complains of gross hematuria. Onset of hematuria was 2 months ago and was sudden in onset. She had some crampy, abdominal pain very briefly at that time. There is not a history of nephrolithiasis. There is not a history of urologic trauma. Other urologic symptoms include rare urge incontinence. Patient admits to history of no risk factors for cancer. Patient denies history of chronic Vera catheter,  surgeries, occupational exposure, sexually transmitted diseases, tobacco use, trauma and urolithiasis. Prior workup has been none. Denies UTIs.     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 She is s/p TURBT with mitomycib on 3/3/17. 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    The following portions of the patient's history were reviewed and updated as appropriate: allergies, current medications, past family history, past medical history, past social history, past surgical history and problem list.    Review of Systems  Constitutional: no fever or chills  ENT: no nasal congestion or sore throat  Respiratory: no cough or shortness of breath  Cardiovascular: no chest pain or palpitations  Gastrointestinal: no nausea or vomiting, tolerating diet  Genitourinary: as per HPI  Hematologic/Lymphatic: no easy bruising or lymphadenopathy  Musculoskeletal: no arthralgias or myalgias  Skin: no rashes or lesions  Neurological: no seizures or tremors  Behavioral/Psych: no auditory or visual hallucinations       Objective:    Vitals:   /86  Pulse 68  Resp 14  Ht 5' 2" (1.575 m)  Wt 79.6 kg (175 lb 7.8 oz)  BMI 32.1 kg/m2    Physical Exam   General: well developed, well nourished in no acute distress  Head: normocephalic, atraumatic  Neck: supple, trachea midline, no obvious enlargement of thyroid  HEENT: EOMI, mucus membranes moist, sclera " anicteric, no hearing impairment  Lungs: symmetric expansion, non-labored breathing  Cardiovascular: regular rate and rhythm, normal pulses  Abdomen: soft, non tender, non distended, no palpable masses, no hepatosplenomegaly, no hernias, no CVA tenderness  Musculoskeletal: no peripheral edema, normal ROM in bilateral upper and lower extremities  Lymphatics: no cervical or inguinal lymphadenopathy  Skin: no rashes or lesions  Neuro: alert and oriented x 3, no gross deficits  Psych: normal judgment and insight, normal mood/affect and non-anxious  Genitourinary:   patient declined exam       Lab Review   Urine analysis today in clinic shows negative for all components     Lab Results   Component Value Date    WBC 7.53 02/24/2017    HGB 14.7 02/24/2017    HCT 43.4 02/24/2017    MCV 93 02/24/2017     02/24/2017     Lab Results   Component Value Date    CREATININE 0.8 02/24/2017    BUN 16 02/24/2017       Imaging  NA         Assessment/Plan:      1. Bladder cancer   - Discussed etiology and workup of hematuria   - Ucx - urine clear today, no need for UCx   - CT urogram - R lateral bladder wall tumor   - Office cystoscopy - tumor in R bladder   - TURBT + mitomycin on 3/3/17 - LG with focal HG Ta   - ReTURBT on 4/7/17   - BCG after TURBT (due to focal HG and large size)       Indications: history bladder cancer    Position:  Dorsal lithotomy  Anesthesia:  Lidocaine jelly  Patient sedated?: No    Preparation: Patient was prepped and draped in usual sterile fashion      Scope type:  Flexible cystoscope  Stent inserted: No    Stent removed: No    External exam normal: Yes    Digital exam performed: No    Urethra normal: Yes  Bladder neck normal: Bladder neck normal   Bladder normal: No (Concerning area of erythema in posterior bladder wall. No obvious tumor though bladder thickened, concerning.)      Patient tolerance:  Patient tolerated the procedure well with no immediate complications     Recommend cysto/BBx in OR -  4/13/18    CT uro 3/18 - negative       Follow up in 2 weeks post-op

## 2018-03-29 NOTE — PROCEDURES
"Cystoscopy  Date/Time: 3/29/2018 4:34 PM  Performed by: LESLY BAKER  Authorized by: LESLY BAKER     Consent Done?:  Yes (Written)  Time out: Immediately prior to procedure a "time out" was called to verify the correct patient, procedure, equipment, support staff and site/side marked as required.    Indications: history bladder cancer    Position:  Dorsal lithotomy  Anesthesia:  Lidocaine jelly  Patient sedated?: No    Preparation: Patient was prepped and draped in usual sterile fashion      Scope type:  Flexible cystoscope  Stent inserted: No    Stent removed: No    External exam normal: Yes    Digital exam performed: No    Urethra normal: Yes  Bladder neck normal: Bladder neck normal   Bladder normal: No (Concerning area of erythema in posterior bladder wall. No obvious tumor though bladder thickened, concerning.)      Patient tolerance:  Patient tolerated the procedure well with no immediate complications     Recommend cysto/BBx in OR - 4/13/18    CT uro 3/18 - negative      "

## 2018-04-04 ENCOUNTER — HOSPITAL ENCOUNTER (OUTPATIENT)
Dept: PREADMISSION TESTING | Facility: HOSPITAL | Age: 83
Discharge: HOME | End: 2018-04-04
Attending: UROLOGY
Payer: MEDICARE

## 2018-04-04 VITALS
DIASTOLIC BLOOD PRESSURE: 76 MMHG | WEIGHT: 173 LBS | HEIGHT: 63 IN | BODY MASS INDEX: 30.65 KG/M2 | HEART RATE: 72 BPM | SYSTOLIC BLOOD PRESSURE: 162 MMHG | OXYGEN SATURATION: 96 % | RESPIRATION RATE: 18 BRPM | TEMPERATURE: 98 F

## 2018-04-04 DIAGNOSIS — Z01.818 PRE-OP TESTING: Primary | ICD-10-CM

## 2018-04-04 LAB
ANION GAP SERPL CALC-SCNC: 6 MMOL/L
BASOPHILS # BLD AUTO: 0.07 K/UL
BASOPHILS NFR BLD: 1 %
BUN SERPL-MCNC: 23 MG/DL
CALCIUM SERPL-MCNC: 10.2 MG/DL
CHLORIDE SERPL-SCNC: 107 MMOL/L
CO2 SERPL-SCNC: 29 MMOL/L
CREAT SERPL-MCNC: 0.8 MG/DL
DIFFERENTIAL METHOD: NORMAL
EOSINOPHIL # BLD AUTO: 0.2 K/UL
EOSINOPHIL NFR BLD: 2.3 %
ERYTHROCYTE [DISTWIDTH] IN BLOOD BY AUTOMATED COUNT: 13.3 %
EST. GFR  (AFRICAN AMERICAN): >60 ML/MIN/1.73 M^2
EST. GFR  (NON AFRICAN AMERICAN): >60 ML/MIN/1.73 M^2
GLUCOSE SERPL-MCNC: 81 MG/DL
HCT VFR BLD AUTO: 43.5 %
HGB BLD-MCNC: 14.2 G/DL
LYMPHOCYTES # BLD AUTO: 3 K/UL
LYMPHOCYTES NFR BLD: 43 %
MCH RBC QN AUTO: 30.9 PG
MCHC RBC AUTO-ENTMCNC: 32.6 G/DL
MCV RBC AUTO: 95 FL
MONOCYTES # BLD AUTO: 0.6 K/UL
MONOCYTES NFR BLD: 8.8 %
NEUTROPHILS # BLD AUTO: 3.2 K/UL
NEUTROPHILS NFR BLD: 44.8 %
PLATELET # BLD AUTO: 170 K/UL
PMV BLD AUTO: 10.7 FL
POTASSIUM SERPL-SCNC: 5.1 MMOL/L
RBC # BLD AUTO: 4.6 M/UL
SODIUM SERPL-SCNC: 142 MMOL/L
WBC # BLD AUTO: 7.07 K/UL

## 2018-04-04 PROCEDURE — 93010 ELECTROCARDIOGRAM REPORT: CPT | Mod: ,,, | Performed by: INTERNAL MEDICINE

## 2018-04-04 PROCEDURE — 80048 BASIC METABOLIC PNL TOTAL CA: CPT

## 2018-04-04 PROCEDURE — 85025 COMPLETE CBC W/AUTO DIFF WBC: CPT

## 2018-04-04 PROCEDURE — 93005 ELECTROCARDIOGRAM TRACING: CPT

## 2018-04-04 NOTE — DISCHARGE INSTRUCTIONS
Your surgery is scheduled for____4/13/2018_____________.    Call [phone] between 2 pm and 5 pm ___4/12/2018_________ to find out your arrival time for the day of surgery.    Report to SAME DAY SURGERY UNIT at _______am on the 2nd floor of the hospital.  Use the front entrance of the hospital before 6 am.  If you need wheelchair assistance, call [phone] from your cell phone,  or call 0 from the courtesy phone in the hospital lobby.    Important instructions:   Do not eat or drink after 12 midnight, including water.  It is okay to brush your teeth.  Do not have gum, candy or mints.     Take only these medications with a small swallow of water on the morning of your surgery.___levothyroxine__________      Stop taking Aspirin, Ibuprofen, Motrin and Aleve , Fish oil, and Vitamin E for at least 7 days before your surgery. You may use Tylenol unless otherwise instructed by your doctor.         Please shower the night before and the morning of your surgery.       Do not wear make- up, including mascara.     You may wear deodorant only.      Do not wear powder, body lotion or cologne.     Do not wear any jewelry or have any metal on your body.     Please bring any documents given to you by your doctor.     If you are going home on the same day of surgery, you must have arrangements for a ride home.  You will not be able to drive home if you were given anesthesia or sedation.     Wear loose fitting clothes allowing for bandages.     Please leave money and valuables home.       You may bring your cell phone.     Call the doctor if fever or illness should occur before your surgery.    Call [phone] to contact us here at [facility] if needed.

## 2018-04-13 ENCOUNTER — ANESTHESIA (OUTPATIENT)
Dept: SURGERY | Facility: HOSPITAL | Age: 83
End: 2018-04-13
Payer: MEDICARE

## 2018-04-13 ENCOUNTER — SURGERY (OUTPATIENT)
Age: 83
End: 2018-04-13

## 2018-04-13 ENCOUNTER — HOSPITAL ENCOUNTER (OUTPATIENT)
Facility: HOSPITAL | Age: 83
Discharge: HOME | End: 2018-04-13
Attending: UROLOGY | Admitting: UROLOGY
Payer: MEDICARE

## 2018-04-13 ENCOUNTER — ANESTHESIA EVENT (OUTPATIENT)
Dept: SURGERY | Facility: HOSPITAL | Age: 83
End: 2018-04-13
Payer: MEDICARE

## 2018-04-13 VITALS
WEIGHT: 173 LBS | RESPIRATION RATE: 20 BRPM | TEMPERATURE: 98 F | OXYGEN SATURATION: 97 % | HEIGHT: 63 IN | DIASTOLIC BLOOD PRESSURE: 76 MMHG | BODY MASS INDEX: 30.65 KG/M2 | SYSTOLIC BLOOD PRESSURE: 143 MMHG | HEART RATE: 73 BPM

## 2018-04-13 DIAGNOSIS — C67.2 MALIGNANT NEOPLASM OF LATERAL WALL OF URINARY BLADDER: Primary | ICD-10-CM

## 2018-04-13 PROCEDURE — 63600175 PHARM REV CODE 636 W HCPCS: Performed by: UROLOGY

## 2018-04-13 PROCEDURE — D9220A PRA ANESTHESIA: Mod: CRNA,,, | Performed by: NURSE ANESTHETIST, CERTIFIED REGISTERED

## 2018-04-13 PROCEDURE — 88305 TISSUE EXAM BY PATHOLOGIST: CPT | Mod: 59 | Performed by: PATHOLOGY

## 2018-04-13 PROCEDURE — 71000015 HC POSTOP RECOV 1ST HR: Performed by: UROLOGY

## 2018-04-13 PROCEDURE — 52204 CYSTOSCOPY W/BIOPSY(S): CPT | Mod: ,,, | Performed by: UROLOGY

## 2018-04-13 PROCEDURE — 36000707: Performed by: UROLOGY

## 2018-04-13 PROCEDURE — 71000039 HC RECOVERY, EACH ADD'L HOUR: Performed by: UROLOGY

## 2018-04-13 PROCEDURE — D9220A PRA ANESTHESIA: Mod: ANES,,, | Performed by: ANESTHESIOLOGY

## 2018-04-13 PROCEDURE — 36000706: Performed by: UROLOGY

## 2018-04-13 PROCEDURE — 71000033 HC RECOVERY, INTIAL HOUR: Performed by: UROLOGY

## 2018-04-13 PROCEDURE — 37000008 HC ANESTHESIA 1ST 15 MINUTES: Performed by: UROLOGY

## 2018-04-13 PROCEDURE — 63600175 PHARM REV CODE 636 W HCPCS: Performed by: NURSE ANESTHETIST, CERTIFIED REGISTERED

## 2018-04-13 PROCEDURE — 25500020 PHARM REV CODE 255: Performed by: UROLOGY

## 2018-04-13 PROCEDURE — 52005 CYSTO W/URTRL CATHJ: CPT | Mod: 59,,, | Performed by: UROLOGY

## 2018-04-13 PROCEDURE — 25000003 PHARM REV CODE 250: Performed by: NURSE ANESTHETIST, CERTIFIED REGISTERED

## 2018-04-13 PROCEDURE — 27200651 HC AIRWAY, LMA: Performed by: NURSE ANESTHETIST, CERTIFIED REGISTERED

## 2018-04-13 PROCEDURE — 88305 TISSUE EXAM BY PATHOLOGIST: CPT | Mod: 26,,, | Performed by: PATHOLOGY

## 2018-04-13 PROCEDURE — 37000009 HC ANESTHESIA EA ADD 15 MINS: Performed by: UROLOGY

## 2018-04-13 PROCEDURE — 74420 UROGRAPHY RTRGR +-KUB: CPT | Mod: 26,,, | Performed by: UROLOGY

## 2018-04-13 RX ORDER — PROPOFOL 10 MG/ML
VIAL (ML) INTRAVENOUS
Status: DISCONTINUED | OUTPATIENT
Start: 2018-04-13 | End: 2018-04-13

## 2018-04-13 RX ORDER — PHENYLEPHRINE HYDROCHLORIDE 10 MG/ML
INJECTION INTRAVENOUS
Status: DISCONTINUED | OUTPATIENT
Start: 2018-04-13 | End: 2018-04-13

## 2018-04-13 RX ORDER — SODIUM CHLORIDE, SODIUM LACTATE, POTASSIUM CHLORIDE, CALCIUM CHLORIDE 600; 310; 30; 20 MG/100ML; MG/100ML; MG/100ML; MG/100ML
INJECTION, SOLUTION INTRAVENOUS CONTINUOUS PRN
Status: DISCONTINUED | OUTPATIENT
Start: 2018-04-13 | End: 2018-04-13

## 2018-04-13 RX ORDER — PHENAZOPYRIDINE HYDROCHLORIDE 100 MG/1
200 TABLET, FILM COATED ORAL
Qty: 18 TABLET | Refills: 0 | Status: SHIPPED | OUTPATIENT
Start: 2018-04-13 | End: 2022-09-21

## 2018-04-13 RX ORDER — ONDANSETRON 2 MG/ML
INJECTION INTRAMUSCULAR; INTRAVENOUS
Status: DISCONTINUED | OUTPATIENT
Start: 2018-04-13 | End: 2018-04-13

## 2018-04-13 RX ORDER — ACETAMINOPHEN 325 MG/1
650 TABLET ORAL EVERY 4 HOURS PRN
Status: DISCONTINUED | OUTPATIENT
Start: 2018-04-13 | End: 2018-04-13 | Stop reason: HOSPADM

## 2018-04-13 RX ORDER — MORPHINE SULFATE 10 MG/ML
2 INJECTION INTRAMUSCULAR; INTRAVENOUS; SUBCUTANEOUS EVERY 5 MIN PRN
Status: DISCONTINUED | OUTPATIENT
Start: 2018-04-13 | End: 2018-04-13 | Stop reason: HOSPADM

## 2018-04-13 RX ORDER — HYDROCODONE BITARTRATE AND ACETAMINOPHEN 5; 325 MG/1; MG/1
1 TABLET ORAL EVERY 4 HOURS PRN
Status: DISCONTINUED | OUTPATIENT
Start: 2018-04-13 | End: 2018-04-13 | Stop reason: HOSPADM

## 2018-04-13 RX ORDER — CIPROFLOXACIN 2 MG/ML
400 INJECTION, SOLUTION INTRAVENOUS
Status: COMPLETED | OUTPATIENT
Start: 2018-04-13 | End: 2018-04-13

## 2018-04-13 RX ORDER — ONDANSETRON 2 MG/ML
4 INJECTION INTRAMUSCULAR; INTRAVENOUS EVERY 12 HOURS PRN
Status: DISCONTINUED | OUTPATIENT
Start: 2018-04-13 | End: 2018-04-13 | Stop reason: HOSPADM

## 2018-04-13 RX ORDER — HYDROMORPHONE HYDROCHLORIDE 2 MG/ML
0.5 INJECTION, SOLUTION INTRAMUSCULAR; INTRAVENOUS; SUBCUTANEOUS EVERY 4 HOURS PRN
Status: DISCONTINUED | OUTPATIENT
Start: 2018-04-13 | End: 2018-04-13 | Stop reason: HOSPADM

## 2018-04-13 RX ORDER — FENTANYL CITRATE 50 UG/ML
INJECTION, SOLUTION INTRAMUSCULAR; INTRAVENOUS
Status: DISCONTINUED | OUTPATIENT
Start: 2018-04-13 | End: 2018-04-13

## 2018-04-13 RX ORDER — LIDOCAINE HCL/PF 100 MG/5ML
SYRINGE (ML) INTRAVENOUS
Status: DISCONTINUED | OUTPATIENT
Start: 2018-04-13 | End: 2018-04-13

## 2018-04-13 RX ORDER — HYDROCODONE BITARTRATE AND ACETAMINOPHEN 5; 325 MG/1; MG/1
1 TABLET ORAL EVERY 6 HOURS PRN
Qty: 8 TABLET | Refills: 0 | Status: SHIPPED | OUTPATIENT
Start: 2018-04-13 | End: 2018-07-30 | Stop reason: ALTCHOICE

## 2018-04-13 RX ORDER — SODIUM CHLORIDE 0.9 % (FLUSH) 0.9 %
3 SYRINGE (ML) INJECTION
Status: DISCONTINUED | OUTPATIENT
Start: 2018-04-13 | End: 2018-04-13 | Stop reason: HOSPADM

## 2018-04-13 RX ORDER — SULFAMETHOXAZOLE AND TRIMETHOPRIM 800; 160 MG/1; MG/1
1 TABLET ORAL 2 TIMES DAILY
Qty: 4 TABLET | Refills: 0 | Status: SHIPPED | OUTPATIENT
Start: 2018-04-13 | End: 2018-04-16

## 2018-04-13 RX ORDER — PHENAZOPYRIDINE HYDROCHLORIDE 100 MG/1
200 TABLET, FILM COATED ORAL 3 TIMES DAILY PRN
Status: DISCONTINUED | OUTPATIENT
Start: 2018-04-13 | End: 2018-04-13 | Stop reason: HOSPADM

## 2018-04-13 RX ADMIN — CIPROFLOXACIN 400 MG: 2 INJECTION, SOLUTION INTRAVENOUS at 07:04

## 2018-04-13 RX ADMIN — PHENYLEPHRINE HYDROCHLORIDE 200 MCG: 10 INJECTION INTRAVENOUS at 07:04

## 2018-04-13 RX ADMIN — LIDOCAINE HYDROCHLORIDE 100 MG: 20 INJECTION, SOLUTION INTRAVENOUS at 07:04

## 2018-04-13 RX ADMIN — ONDANSETRON 4 MG: 2 INJECTION, SOLUTION INTRAMUSCULAR; INTRAVENOUS at 06:04

## 2018-04-13 RX ADMIN — PROPOFOL 150 MG: 10 INJECTION, EMULSION INTRAVENOUS at 07:04

## 2018-04-13 RX ADMIN — IOHEXOL 100 ML: 300 INJECTION, SOLUTION INTRAVENOUS at 07:04

## 2018-04-13 RX ADMIN — PHENYLEPHRINE HYDROCHLORIDE 100 MCG: 10 INJECTION INTRAVENOUS at 07:04

## 2018-04-13 RX ADMIN — FENTANYL CITRATE 50 MCG: 50 INJECTION INTRAMUSCULAR; INTRAVENOUS at 06:04

## 2018-04-13 RX ADMIN — PHENYLEPHRINE HYDROCHLORIDE 100 MCG: 10 INJECTION INTRAVENOUS at 08:04

## 2018-04-13 RX ADMIN — SODIUM CHLORIDE, SODIUM LACTATE, POTASSIUM CHLORIDE, AND CALCIUM CHLORIDE: .6; .31; .03; .02 INJECTION, SOLUTION INTRAVENOUS at 06:04

## 2018-04-13 RX ADMIN — FENTANYL CITRATE 50 MCG: 50 INJECTION INTRAMUSCULAR; INTRAVENOUS at 07:04

## 2018-04-13 NOTE — BRIEF OP NOTE
[facility]  Brief Operative Note     SUMMARY     Surgery Date: 4/13/2018     Surgeon(s) and Role:     * Ifrah Falk MD - Primary    Assisting Surgeon: None    Pre-op Diagnosis:  Malignant neoplasm of lateral wall of urinary bladder [C67.2]    Post-op Diagnosis:  Post-Op Diagnosis Codes:     * Malignant neoplasm of lateral wall of urinary bladder [C67.2]    Procedure(s) (LRB):  CYSTOSCOPY   BILATERAL RETROGRADE PYELOGRAM  BLADDER BIOPSY WITH FULGURATION      Anesthesia: Choice    Description of the findings of the procedure: Thickened erythematous area in R posterior bladder wall. Erythematous areas also noted in L bladder wall, small biopsy of this area taken.    Findings/Key Components: Normal RPG    Estimated Blood Loss: 5cc         Specimens:   Specimen (12h ago through future)    Start     Ordered    04/13/18 0827  Specimen to Pathology - Surgery  Once     Comments:  Posterior Bladder BiopsyLeft Lateral Bladder Biopsy      04/13/18 0826          Discharge Note    SUMMARY     Admit Date: 4/13/2018    Discharge Date and Time:  04/13/2018 8:33 AM    Hospital Course (synopsis of major diagnoses, care, treatment, and services provided during the course of the hospital stay): Uncomplicated BBx     Final Diagnosis: Post-Op Diagnosis Codes:     * Malignant neoplasm of lateral wall of urinary bladder [C67.2]    Disposition: Home or Self Care    Follow Up/Patient Instructions:     Medications:  Reconciled Home Medications:      Medication List      START taking these medications    hydrocodone-acetaminophen 5-325mg 5-325 mg per tablet  Commonly known as:  NORCO  Take 1 tablet by mouth every 6 (six) hours as needed for Pain.     phenazopyridine 100 MG tablet  Commonly known as:  PYRIDIUM  Take 2 tablets (200 mg total) by mouth 3 (three) times daily with meals.     sulfamethoxazole-trimethoprim 800-160mg 800-160 mg Tab  Commonly known as:  BACTRIM DS  Take 1 tablet by mouth 2 (two) times  daily.        CONTINUE taking these medications    B-12 DOTS 500 MCG tablet  Generic drug:  cyanocobalamin  Take 500 mcg by mouth once daily.     BENADRYL ALLERGY ORAL  Take 12.5 mg by mouth daily as needed.     CENTRUM SILVER ULTRA WOMEN'S Tab  Generic drug:  multivit-mineral-iron-lutein  Take 1 tablet by mouth daily as needed.     cetirizine 10 MG tablet  Commonly known as:  ZYRTEC  Take 10 mg by mouth once daily.     FIBER CHOICE ORAL  Take 1 tablet by mouth daily as needed.     FINACEA 15 % gel  Generic drug:  azelaic acid  Apply topically 2 (two) times daily.     levothyroxine 100 MCG tablet  Commonly known as:  SYNTHROID     [employer] ORAL  Take 1 capsule by mouth daily as needed.     VITAMIN D ORAL  Take 1 tablet by mouth once daily.     vitamin E 600 UNIT capsule  Take 600 Units by mouth once daily.            Discharge Procedure Orders  Diet general     Activity as tolerated     Call MD for:  temperature >100.4     Call MD for:  persistent nausea and vomiting     Call MD for:  severe uncontrolled pain     Call MD for:  difficulty breathing, headache or visual disturbances     No dressing needed       Follow-up Information     Ifrah Falk MD In 2 weeks.    Specialty:  Urology  Why:  For post-op follow up  Contact information:  [address] [place], [place] [address] [phone]                   #[id number]

## 2018-04-13 NOTE — DISCHARGE INSTRUCTIONS
Expect blood and/or burning with urination. Drink plenty of fluids.        ACTIVITY LEVEL: If you have received sedation or an anesthetic, you may feel sleepy for several hours. Rest until you are more awake. Gradually resume your normal activities.       DIET: You may resume your home diet. If nausea is present, increase your diet gradually with fluids and bland foods.      Medications: Pain medication should be taken only if needed and as directed. If antibiotics are prescribed, the medication should be taken until completed. You will be given an updated list of you medications.  ? No driving, alcoholic beverages or signing legal documents for next 24 hours or while taking pain medication        CALL THE DOCTOR:       · Fever over 101°F  · Severe pain that doesnt go away with medication.  · Upset stomach and vomiting that is persistent.  · Problems urinating-unable to urinate or heavy bleeding (with or without clots)    Fall Prevention  Millions of people fall every year and injure themselves. You may have had anesthesia or sedation which may increase your risk of falling. You may have health issues that put you at an increased risk of falling.     Here are ways to reduce your risk of falling.  ·   · Make your home safe by keeping walkways clear of objects you may trip over.  · Use non-slip pads under rugs. Do not use area rugs or small throw rugs.  · Use non-slip mats in bathtubs and showers.  · Install handrails and lights on staircases.  · Do not walk in poorly lit areas.  · Do not stand on chairs or wobbly ladders.  · Use caution when reaching overhead or looking upward. This position can cause a loss of balance.  · Be sure your shoes fit properly, have non-slip bottoms and are in good condition.   · Wear shoes both inside and out. Avoid going barefoot or wearing slippers.  · Be cautious when going up and down stairs, curbs, and when walking on uneven sidewalks.  · If your balance is poor, consider using a  cane or walker.  · If your fall was related to alcohol use, stop or limit alcohol intake.   · If your fall was related to use of sleeping medicines, talk to your doctor about this. You may need to reduce your dosage at bedtime if you awaken during the night to go to the bathroom.    · To reduce the need for nighttime bathroom trips:  ¨ Avoid drinking fluids for several hours before going to bed  ¨ Empty your bladder before going to bed  ¨ Men can keep a urinal at the bedside  · Stay as active as you can. Balance, flexibility, strength, and endurance all come from exercise. They all play a role in preventing falls. Ask your healthcare provider which types of activity are right for you.  · Get your vision checked on a regular basis.  · If you have pets, know where they are before you stand up or walk so you don't trip over them.  · Use night lights.

## 2018-04-13 NOTE — PLAN OF CARE
Problem: Patient Care Overview  Goal: Plan of Care Review   04/13/18 0906   Coping/Psychosocial   Plan Of Care Reviewed With patient   Ready for transfer to same day surgery unit. AA&OX4. Denies pain. Voided on bedpan without difficulty.

## 2018-04-13 NOTE — ANESTHESIA PREPROCEDURE EVALUATION
04/13/2018  Ella Sarmiento is a 82 y.o., female.    Pre-op Assessment    I have reviewed the Patient Summary Reports.      I have reviewed the Medications.     Review of Systems  Anesthesia Hx:  No problems with previous Anesthesia Denies Hx of Anesthetic complications  History of prior surgery of interest to airway management or planning: Denies Family Hx of Anesthesia complications.   Denies Personal Hx of Anesthesia complications.   Social:  No Alcohol Use, Non-Smoker    Hematology/Oncology:  Hematology Normal   Oncology Normal     EENT/Dental:   chronic allergic rhinitis   Cardiovascular:  Cardiovascular Normal Exercise tolerance: good     Pulmonary:  Pulmonary Normal    Renal/:   Gross hematuria, large bladder tumor   Hepatic/GI:  Hepatic/GI Normal    Musculoskeletal:   Arthritis     Neurological:  Neurology Normal    Endocrine:   Hypothyroidism    Psych:  Psychiatric Normal           Physical Exam  General:  Well nourished, Obesity    Airway/Jaw/Neck:  Airway Findings: Mouth Opening: Small, but > 3cm Tongue: Normal  General Airway Assessment: Adult, Average  Mallampati: III  TM Distance: Normal, at least 6 cm  Jaw/Neck Findings:  Neck ROM: Normal ROM      Dental:  Dental Findings: In tact   Chest/Lungs:  Chest/Lungs Findings: Normal Respiratory Rate, Clear to auscultation     Heart/Vascular:  Heart Findings: Rate: Normal  Rhythm: Regular Rhythm  Sounds: Normal        Mental Status:  Mental Status Findings:  Alert and Oriented, Cooperative         Anesthesia Plan  Type of Anesthesia, risks & benefits discussed:  Anesthesia Type:  general  Patient's Preference:   Intra-op Monitoring Plan: standard ASA monitors  Intra-op Monitoring Plan Comments:   Post Op Pain Control Plan:   Post Op Pain Control Plan Comments:   Induction:   IV  Beta Blocker:  Patient is not currently on a Beta-Blocker (No further  documentation required).       Informed Consent: Patient understands risks and agrees with Anesthesia plan.  Questions answered. Anesthesia consent signed with patient.  ASA Score: 3     Day of Surgery Review of History & Physical:    H&P update referred to the surgeon.         Ready For Surgery From Anesthesia Perspective.

## 2018-04-13 NOTE — OP NOTE
DATE OF PROCEDURE:  04/13/2018    SURGEON:  Ifrah Falk M.D.    PREOPERATIVE DIAGNOSIS:  Bladder cancer.    POSTOPERATIVE DIAGNOSIS:  Bladder cancer.    PROCEDURE PERFORMED:  Cystoscopy with bilateral retrograde pyelogram, bladder   biopsy and fulguration.    INDICATIONS FOR PROCEDURE:  Ms. Sarmiento is an 82-year-old female who had   hematuria workup that showed a 2.5-cm right lateral bladder wall tumor.  She   underwent TURBT in 03/17 with pathology that showed high-grade Ta urothelial   carcinoma.  She underwent BCG as well as surveillance cystoscopy.  Her most   recent surveillance cystoscopy showed some thickened area of the bladder   posteriorly with some erythema.  She presents today for bladder biopsy.    PROCEDURE IN DETAIL:  Ms. Sarmiento was brought to the Operating Room and placed   under general LMA anesthesia.  Full time-out procedures were performed   identifying the correct patient and procedure.  Appropriate IV antibiotics with   ciprofloxacin was given prior to commencement of surgery.  The patient was   placed in the dorsal lithotomy position and prepped and draped in the usual   sterile fashion.    A 22-Romanian rigid cystoscope was passed per urethra and into the bladder.  Full   cystoscopy was performed, which showed a thickened erythematous area in the   right posterior bladder wall.  There was also noted what appeared to be a prior   resection site.  It was on the contralateral side in the left bladder with some   erythema.  Both of these were deemed appropriate for biopsy.    Prior to biopsy, bilateral retrograde pyelogram was performed.  A cone-tipped   ureteral catheter was then placed first in the left ureteral orifice and gentle   retrograde pyelogram was performed.  There were no filling defects or other   abnormalities throughout the entirety of the retrograde pyelogram both in the   ureter and the collecting system.  Contrast was noted to drain without issue.    Similar procedure was  performed on the patient's contralateral side.  The   cone-tipped catheter was placed in the right ureteral orifice and the retrograde   pyelogram was noted to be normal without filling defects or other   abnormalities.    Next, I proceeded with bladder biopsy.  A rigid biopsy forceps were used to take   approximately 5 biopsies from the posterior erythematous area.  These were sent   with the specimen labeled posterior bladder wall biopsy.  A Bugbee cautery was   then used for hemostasis as well as fulguration of remaining erythematous areas.    A cold cup biopsy was also used to take a small specimen of the area in the left   bladder wall.  There was noted what seemed to be scarring and therefore   difficulty in getting good bites of the bladder wall.  A small biopsy was able   to be taken and was sent as left bladder wall biopsy.  Bugbee cautery was then   used for hemostasis and fulguration was done widely in this area.  The bladder   was then drained and then reinstilled and was noted to have hemostatic area from   both biopsy sites.  Pictures were taken before and after of the bladder biopsy   sites.    The patient's bladder was then drained and the cystoscope was removed.  The   patient was awakened from general anesthesia and transferred to the PACU in a   stable condition.    COMPLICATIONS:  None.    FINDINGS:  Thickened erythematous area in the right posterior bladder wall as   well as an erythematous area in left lateral bladder wall, biopsies of each   taken.    ESTIMATED BLOOD LOSS:  5 mL.    DRAINS:  None.    SPECIMENS:  Posterior bladder biopsy and left lateral bladder biopsy.    PATIENT DISPOSITION:  The patient is stable and deemed appropriate for discharge   home.  She will follow up in approximately 2 weeks for postoperative check and   review of her pathology.          /mecca [id number] vimla(s)        EKP/HN  dd: 04/13/2018 08:38:26 (CDT)  td: 04/13/2018 10:31:34 (CDT)  Doc ID   #[id number]  Job ID  #[id number]    CC:

## 2018-04-13 NOTE — TRANSFER OF CARE
"Anesthesia Transfer of Care Note    Patient: Ella Sarmiento    Procedure(s) Performed: Procedure(s) (LRB):  CYSTOSCOPY W/BLADDER BIOPSY,POSSIBLE FULGURATION-BLADDER, retrograde pyelogram (Bilateral)    Patient location: PACU    Anesthesia Type: general    Transport from OR: Transported from OR on room air with adequate spontaneous ventilation    Post pain: adequate analgesia    Post assessment: no apparent anesthetic complications and tolerated procedure well    Post vital signs: stable    Level of consciousness: awake, alert and oriented    Nausea/Vomiting: no nausea/vomiting    Complications: none    Transfer of care protocol was followed      Last vitals:   Visit Vitals  BP (!) 148/70   Pulse 78   Temp 36.5 °C (97.7 °F) (Oral)   Resp 16   Ht 5' 3" (1.6 m)   Wt 78.5 kg (173 lb)   SpO2 98%   Breastfeeding? No   BMI 30.65 kg/m²     "

## 2018-04-13 NOTE — ANESTHESIA POSTPROCEDURE EVALUATION
"Anesthesia Post Evaluation    Patient: Ella Sarmiento    Procedure(s) Performed: Procedure(s) (LRB):  CYSTOSCOPY W/BLADDER BIOPSY,POSSIBLE FULGURATION-BLADDER, retrograde pyelogram (Bilateral)    Final Anesthesia Type: general  Patient location during evaluation: PACU  Patient participation: Yes- Able to Participate  Level of consciousness: awake and alert, oriented and awake  Post-procedure vital signs: reviewed and stable  Airway patency: patent  PONV status at discharge: No PONV  Anesthetic complications: no      Cardiovascular status: blood pressure returned to baseline  Respiratory status: unassisted, spontaneous ventilation and room air  Hydration status: euvolemic  Follow-up not needed.        Visit Vitals  BP (!) 158/68   Pulse 68   Temp 36.8 °C (98.2 °F)   Resp 16   Ht 5' 3" (1.6 m)   Wt 78.5 kg (173 lb)   SpO2 99%   Breastfeeding? No   BMI 30.65 kg/m²       Pain/Gonzalo Score: Pain Assessment Performed: Yes (4/13/2018  8:22 AM)  Presence of Pain: denies (4/13/2018  8:22 AM)  Gonzalo Score: 8 (4/13/2018  8:22 AM)      "

## 2018-04-23 ENCOUNTER — OFFICE VISIT (OUTPATIENT)
Dept: UROLOGY | Facility: CLINIC | Age: 83
End: 2018-04-23
Payer: MEDICARE

## 2018-04-23 VITALS
DIASTOLIC BLOOD PRESSURE: 70 MMHG | WEIGHT: 173.06 LBS | HEIGHT: 63 IN | BODY MASS INDEX: 30.66 KG/M2 | RESPIRATION RATE: 16 BRPM | SYSTOLIC BLOOD PRESSURE: 100 MMHG | HEART RATE: 68 BPM

## 2018-04-23 DIAGNOSIS — R31.0 HEMATURIA, GROSS: ICD-10-CM

## 2018-04-23 DIAGNOSIS — C67.2 MALIGNANT NEOPLASM OF LATERAL WALL OF URINARY BLADDER: ICD-10-CM

## 2018-04-23 DIAGNOSIS — C67.9 BLADDER CARCINOMA: Primary | ICD-10-CM

## 2018-04-23 PROCEDURE — 99214 OFFICE O/P EST MOD 30 MIN: CPT | Mod: S$PBB,,, | Performed by: UROLOGY

## 2018-04-23 PROCEDURE — 99999 PR PBB SHADOW E&M-EST. PATIENT-LVL III: CPT | Mod: PBBFAC,,, | Performed by: UROLOGY

## 2018-04-23 PROCEDURE — 99213 OFFICE O/P EST LOW 20 MIN: CPT | Mod: PBBFAC | Performed by: UROLOGY

## 2018-04-23 NOTE — PROGRESS NOTES
Subjective:       Ella Sarmiento is a 82 y.o. female who is an established patient who was referred by Dr Cooper for evaluation of hematuria.      Hematuria  Patient complains of gross hematuria. Onset of hematuria was 2 months ago and was sudden in onset. She had some crampy, abdominal pain very briefly at that time. There is not a history of nephrolithiasis. There is not a history of urologic trauma. Other urologic symptoms include rare urge incontinence. Patient admits to history of no risk factors for cancer. Patient denies history of chronic Vera catheter,  surgeries, occupational exposure, sexually transmitted diseases, tobacco use, trauma and urolithiasis. Prior workup has been none. Denies UTIs.     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 She is s/p TURBT with mitomycin on 3/3/17. 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 ReTURBT on 4/7/17 was negative.     PVR (bladder scan) last visit- 41cc    She has had BCG induction and maintenance with regular cystoscopy. Last cystoscopy in office showed areas of concern in posterior bladder wall. Cysto/BBx in OR on 4/13/18 was negative (chronic inflammation).       The following portions of the patient's history were reviewed and updated as appropriate: allergies, current medications, past family history, past medical history, past social history, past surgical history and problem list.    Review of Systems  Constitutional: no fever or chills  ENT: no nasal congestion or sore throat  Respiratory: no cough or shortness of breath  Cardiovascular: no chest pain or palpitations  Gastrointestinal: no nausea or vomiting, tolerating diet  Genitourinary: as per HPI  Hematologic/Lymphatic: no easy bruising or lymphadenopathy  Musculoskeletal: no arthralgias or myalgias  Skin: no rashes or lesions  Neurological: no seizures or tremors  Behavioral/Psych: no auditory or visual hallucinations       Objective:    Vitals:   /70   Pulse 68   Resp 16   Ht 5'  "3" (1.6 m)   Wt 78.5 kg (173 lb 1 oz)   BMI 30.66 kg/m²     Physical Exam   General: well developed, well nourished in no acute distress  Head: normocephalic, atraumatic  Neck: supple, trachea midline, no obvious enlargement of thyroid  HEENT: EOMI, mucus membranes moist, sclera anicteric, no hearing impairment  Lungs: symmetric expansion, non-labored breathing  Cardiovascular: regular rate and rhythm, normal pulses  Abdomen: soft, non tender, non distended, no palpable masses, no hepatosplenomegaly, no hernias, no CVA tenderness  Musculoskeletal: no peripheral edema, normal ROM in bilateral upper and lower extremities  Lymphatics: no cervical or inguinal lymphadenopathy  Skin: no rashes or lesions  Neuro: alert and oriented x 3, no gross deficits  Psych: normal judgment and insight, normal mood/affect and non-anxious  Genitourinary:   patient declined exam       Lab Review   Urine analysis today in clinic shows ++LE, 50 RBCs    Lab Results   Component Value Date    WBC 7.07 04/04/2018    HGB 14.2 04/04/2018    HCT 43.5 04/04/2018    MCV 95 04/04/2018     04/04/2018     Lab Results   Component Value Date    CREATININE 0.8 04/04/2018    BUN 23 04/04/2018     Imaging  Images and reports were personally reviewed by me and discussed with patient        Assessment/Plan:      1. Bladder cancer   - Discussed etiology and workup of hematuria   - Ucx - urine clear today, no need for UCx   - CT urogram - R lateral bladder wall tumor   - Office cystoscopy - tumor in R bladder   - TURBT + mitomycin on 3/3/17 - LG with focal HG Ta   - ReTURBT 4/7/17 - negative    - Recommend BCG (due to focal HG and large size)   - BCG maintenance x 1 year (intermediate risk)   - Cystoscopy in 3 months, if clear then q6mth cysto x 4 years (3/21)     2. Dysuria   - Pyridium PRN       Follow up in 3 months for cystoscopy       "

## 2018-07-30 ENCOUNTER — PROCEDURE VISIT (OUTPATIENT)
Dept: UROLOGY | Facility: CLINIC | Age: 83
End: 2018-07-30
Payer: MEDICARE

## 2018-07-30 VITALS — HEIGHT: 63 IN | HEART RATE: 60 BPM | WEIGHT: 169.75 LBS | BODY MASS INDEX: 30.08 KG/M2 | RESPIRATION RATE: 14 BRPM

## 2018-07-30 DIAGNOSIS — C67.2 MALIGNANT NEOPLASM OF LATERAL WALL OF URINARY BLADDER: ICD-10-CM

## 2018-07-30 DIAGNOSIS — C67.9 BLADDER CARCINOMA: ICD-10-CM

## 2018-07-30 DIAGNOSIS — R31.0 HEMATURIA, GROSS: ICD-10-CM

## 2018-07-30 PROCEDURE — 88112 CYTOPATH CELL ENHANCE TECH: CPT | Mod: 26,,, | Performed by: PATHOLOGY

## 2018-07-30 PROCEDURE — 52000 CYSTOURETHROSCOPY: CPT | Mod: PBBFAC | Performed by: UROLOGY

## 2018-07-30 PROCEDURE — 88112 CYTOPATH CELL ENHANCE TECH: CPT | Performed by: PATHOLOGY

## 2018-07-30 RX ORDER — DICYCLOMINE HYDROCHLORIDE 10 MG/1
CAPSULE ORAL
COMMUNITY
Start: 2018-07-20

## 2018-07-30 NOTE — PROCEDURES
"Cystoscopy  Date/Time: 7/30/2018 3:16 PM  Performed by: LESLY BAKER  Authorized by: LESLY BAKER     Consent Done?:  Yes (Written)  Time out: Immediately prior to procedure a "time out" was called to verify the correct patient, procedure, equipment, support staff and site/side marked as required.    Indications: history bladder cancer    Position:  Dorsal lithotomy  Anesthesia:  Lidocaine jelly  Patient sedated?: No    Preparation: Patient was prepped and draped in usual sterile fashion      Scope type:  Flexible cystoscope  Stent inserted: No    Stent removed: No    External exam normal: Yes    Digital exam performed: No    Urethra normal: Yes  Bladder neck normal: Bladder neck normal   Bladder normal: No (Similar appearing erythematous areas in R posterior and L lateral bladder wall. Scant stone formation. No definitive tumors. )      Patient tolerance:  Patient tolerated the procedure well with no immediate complications     Will send urine cytology  Cystoscopy in 6 mths      "

## 2018-07-31 DIAGNOSIS — C67.9 MALIGNANT NEOPLASM OF URINARY BLADDER, UNSPECIFIED SITE: Primary | ICD-10-CM

## 2018-08-01 ENCOUNTER — TELEPHONE (OUTPATIENT)
Dept: UROLOGY | Facility: CLINIC | Age: 83
End: 2018-08-01

## 2018-08-23 ENCOUNTER — CLINICAL SUPPORT (OUTPATIENT)
Dept: UROLOGY | Facility: CLINIC | Age: 83
End: 2018-08-23
Payer: MEDICARE

## 2018-08-23 VITALS — BODY MASS INDEX: 30.46 KG/M2 | RESPIRATION RATE: 16 BRPM | HEIGHT: 63 IN | WEIGHT: 171.94 LBS

## 2018-08-23 DIAGNOSIS — C67.2 MALIGNANT NEOPLASM OF LATERAL WALL OF URINARY BLADDER: Primary | ICD-10-CM

## 2018-08-23 PROCEDURE — 51720 TREATMENT OF BLADDER LESION: CPT | Mod: S$PBB,,, | Performed by: NURSE PRACTITIONER

## 2018-08-23 PROCEDURE — 99999 PR PBB SHADOW E&M-EST. PATIENT-LVL III: CPT | Mod: PBBFAC,,,

## 2018-08-23 PROCEDURE — 96372 THER/PROPH/DIAG INJ SC/IM: CPT | Mod: PBBFAC

## 2018-08-23 PROCEDURE — 99213 OFFICE O/P EST LOW 20 MIN: CPT | Mod: PBBFAC,25

## 2018-08-23 PROCEDURE — 51720 TREATMENT OF BLADDER LESION: CPT | Mod: PBBFAC

## 2018-08-23 RX ADMIN — BACILLUS CALMETTE-GUERIN 50 MG: 50 POWDER, FOR SUSPENSION INTRAVESICAL at 03:08

## 2018-08-30 ENCOUNTER — CLINICAL SUPPORT (OUTPATIENT)
Dept: UROLOGY | Facility: CLINIC | Age: 83
End: 2018-08-30
Payer: MEDICARE

## 2018-08-30 VITALS — HEIGHT: 63 IN | RESPIRATION RATE: 16 BRPM | WEIGHT: 171.94 LBS | BODY MASS INDEX: 30.46 KG/M2

## 2018-08-30 DIAGNOSIS — C67.9 MALIGNANT NEOPLASM OF URINARY BLADDER, UNSPECIFIED SITE: Primary | ICD-10-CM

## 2018-08-30 PROCEDURE — 51720 TREATMENT OF BLADDER LESION: CPT | Mod: S$PBB,,, | Performed by: NURSE PRACTITIONER

## 2018-08-30 PROCEDURE — 99213 OFFICE O/P EST LOW 20 MIN: CPT | Mod: PBBFAC,25

## 2018-08-30 PROCEDURE — 96372 THER/PROPH/DIAG INJ SC/IM: CPT | Mod: PBBFAC

## 2018-08-30 PROCEDURE — 99999 PR PBB SHADOW E&M-EST. PATIENT-LVL III: CPT | Mod: PBBFAC,,,

## 2018-08-30 PROCEDURE — 51720 TREATMENT OF BLADDER LESION: CPT | Mod: PBBFAC

## 2018-08-30 RX ADMIN — BACILLUS CALMETTE-GUERIN 50 MG: 50 POWDER, FOR SUSPENSION INTRAVESICAL at 03:08

## 2018-09-06 ENCOUNTER — CLINICAL SUPPORT (OUTPATIENT)
Dept: UROLOGY | Facility: CLINIC | Age: 83
End: 2018-09-06
Payer: MEDICARE

## 2018-09-06 VITALS — BODY MASS INDEX: 31.64 KG/M2 | WEIGHT: 171.94 LBS | HEIGHT: 62 IN | RESPIRATION RATE: 16 BRPM

## 2018-09-06 DIAGNOSIS — C67.2 MALIGNANT NEOPLASM OF LATERAL WALL OF URINARY BLADDER: Primary | ICD-10-CM

## 2018-09-06 DIAGNOSIS — N39.0 URINARY TRACT INFECTION WITH HEMATURIA, SITE UNSPECIFIED: ICD-10-CM

## 2018-09-06 DIAGNOSIS — R31.9 URINARY TRACT INFECTION WITH HEMATURIA, SITE UNSPECIFIED: ICD-10-CM

## 2018-09-06 PROCEDURE — 99999 PR PBB SHADOW E&M-EST. PATIENT-LVL III: CPT | Mod: PBBFAC,,,

## 2018-09-06 PROCEDURE — 51720 TREATMENT OF BLADDER LESION: CPT | Mod: PBBFAC

## 2018-09-06 PROCEDURE — 99499 UNLISTED E&M SERVICE: CPT | Mod: S$PBB,,, | Performed by: NURSE PRACTITIONER

## 2018-09-06 PROCEDURE — 87086 URINE CULTURE/COLONY COUNT: CPT

## 2018-09-06 PROCEDURE — 99213 OFFICE O/P EST LOW 20 MIN: CPT | Mod: PBBFAC

## 2018-09-06 NOTE — PROGRESS NOTES
Name, , and allergies verified. Dipstick performed- WBC and blood present and patient is symptomatic w/possible UTI. Provider notified and tx withheld for today- UCx order placed.

## 2018-09-08 LAB — BACTERIA UR CULT: NORMAL

## 2018-09-10 ENCOUNTER — TELEPHONE (OUTPATIENT)
Dept: UROLOGY | Facility: HOSPITAL | Age: 83
End: 2018-09-10

## 2018-09-10 RX ORDER — SULFAMETHOXAZOLE AND TRIMETHOPRIM 800; 160 MG/1; MG/1
1 TABLET ORAL 2 TIMES DAILY
Qty: 10 TABLET | Refills: 0 | Status: SHIPPED | OUTPATIENT
Start: 2018-09-10 | End: 2018-09-15

## 2018-09-10 NOTE — TELEPHONE ENCOUNTER
No definitive UTI noted on UCx. Will treat empirically due to upcoming BCG.     Bactrim x 5d to pharmacy.

## 2018-09-13 ENCOUNTER — CLINICAL SUPPORT (OUTPATIENT)
Dept: UROLOGY | Facility: CLINIC | Age: 83
End: 2018-09-13
Payer: MEDICARE

## 2018-09-13 VITALS — BODY MASS INDEX: 31.64 KG/M2 | HEIGHT: 62 IN | WEIGHT: 171.94 LBS | RESPIRATION RATE: 16 BRPM

## 2018-09-13 DIAGNOSIS — C67.2 MALIGNANT NEOPLASM OF LATERAL WALL OF URINARY BLADDER: Primary | ICD-10-CM

## 2018-09-13 PROCEDURE — 51720 TREATMENT OF BLADDER LESION: CPT | Mod: PBBFAC

## 2018-09-13 PROCEDURE — 51720 TREATMENT OF BLADDER LESION: CPT | Mod: S$PBB,,, | Performed by: NURSE PRACTITIONER

## 2018-09-13 PROCEDURE — 99999 PR PBB SHADOW E&M-EST. PATIENT-LVL II: CPT | Mod: PBBFAC,,,

## 2018-09-13 PROCEDURE — 99212 OFFICE O/P EST SF 10 MIN: CPT | Mod: PBBFAC

## 2018-09-13 PROCEDURE — 96372 THER/PROPH/DIAG INJ SC/IM: CPT | Mod: PBBFAC

## 2018-09-13 RX ADMIN — BACILLUS CALMETTE-GUERIN 50 MG: 50 POWDER, FOR SUSPENSION INTRAVESICAL at 04:09

## 2018-10-02 NOTE — IP AVS SNAPSHOT
[place] [address] [address] [place] [address]  Phone: [phone]           Patient Discharge Instructions   Our goal is to set you up for success. This packet includes information on your condition, medications, and your home care.  It will help you care for yourself to prevent having to return to the hospital.     Please ask your nurse if you have any questions.      There are many details to remember when preparing to leave the hospital. Here is what you will need to do:    1. Take your medicine. If you are prescribed medications, review your Medication List on the following pages. You may have new medications to  at the pharmacy and others that you'll need to stop taking. Review the instructions for how and when to take your medications. Talk with your doctor or nurses if you are unsure of what to do.     2. Go to your follow-up appointments. Specific follow-up information is listed in the following pages. Your may be contacted by a nurse or clinical provider about future appointments. Be sure we have all of the phone numbers to reach you. Please contact your provider's office if you are unable to make an appointment.     3. Watch for warning signs. Your doctor or nurse will give you detailed warning signs to watch for and when to call for assistance. These instructions may also include educational information about your condition. If you experience any of warning signs to your health, call your doctor.           [facility]  Unless otherwise directed by your provider, please   contact [facility], our nurse care line   that is available for 24/7 assistance.     [phone] (toll-free)     Registered nurses in the [facility]   provide: appointment scheduling, clinical advisement, health education, and other advisory services.                  ** Verify the list of medication(s) below is accurate and up to date. Carry this with you in case of emergency.  If your medications have changed, please notify your healthcare provider.             Medication List      START taking these medications        Additional Info                      ciprofloxacin HCl 500 MG tablet   Commonly known as:  CIPRO   Quantity:  4 tablet   Refills:  0   Dose:  500 mg    Instructions:  Take 1 tablet (500 mg total) by mouth 2 (two) times daily.     Begin Date    AM    Noon    PM    Bedtime       hydrocodone-acetaminophen 5-325mg 5-325 mg per tablet   Commonly known as:  NORCO   Quantity:  10 tablet   Refills:  0   Dose:  1 tablet    Instructions:  Take 1 tablet by mouth every 6 (six) hours as needed for Pain.     Begin Date    AM    Noon    PM    Bedtime       phenazopyridine 100 MG tablet   Commonly known as:  PYRIDIUM   Quantity:  18 tablet   Refills:  0   Dose:  200 mg    Instructions:  Take 2 tablets (200 mg total) by mouth 3 (three) times daily as needed (bladder pain).     Begin Date    AM    Noon    PM    Bedtime         CONTINUE taking these medications        Additional Info                      aspirin 81 MG EC tablet   Commonly known as:  ECOTRIN   Refills:  0   Dose:  81 mg    Instructions:  Take 81 mg by mouth once daily.     Begin Date    AM    Noon    PM    Bedtime       B-12 DOTS 500 MCG tablet   Refills:  0   Dose:  500 mcg   Generic drug:  cyanocobalamin    Instructions:  Take 500 mcg by mouth once daily.     Begin Date    AM    Noon    PM    Bedtime       BENADRYL ALLERGY ORAL   Refills:  0   Dose:  12.5 mg    Instructions:  Take 12.5 mg by mouth daily as needed.     Begin Date    AM    Noon    PM    Bedtime       CENTRUM SILVER ULTRA WOMEN'S Tab   Refills:  0   Dose:  1 tablet   Generic drug:  multivit-mineral-iron-lutein    Instructions:  Take 1 tablet by mouth daily as needed.     Begin Date    AM    Noon    PM    Bedtime       cetirizine 10 MG tablet   Commonly known as:  ZYRTEC   Refills:  0   Dose:  10 mg    Instructions:  Take 10 mg by mouth once daily.     Begin  Date    AM    Noon    PM    Bedtime       FIBER CHOICE ORAL   Refills:  0   Dose:  1 tablet    Instructions:  Take 1 tablet by mouth daily as needed.     Begin Date    AM    Noon    PM    Bedtime       FINACEA 15 % cream   Refills:  0   Generic drug:  azelaic acid    Instructions:  Apply topically 2 (two) times daily.     Begin Date    AM    Noon    PM    Bedtime       hydrOXYzine HCl 25 MG tablet   Commonly known as:  ATARAX   Refills:  0   Dose:  25 mg    Instructions:  Take 25 mg by mouth 4 (four) times daily as needed for Itching.     Begin Date    AM    Noon    PM    Bedtime       levothyroxine 100 MCG tablet   Commonly known as:  SYNTHROID   Refills:  0      Begin Date    AM    Noon    PM    Bedtime       [employer]lift ORAL   Refills:  0   Dose:  1 capsule    Instructions:  Take 1 capsule by mouth daily as needed.     Begin Date    AM    Noon    PM    Bedtime       vitamin E 600 UNIT capsule   Refills:  0   Dose:  600 Units    Instructions:  Take 600 Units by mouth once daily.     Begin Date    AM    Noon    PM    Bedtime            Where to Get Your Medications      You can get these medications from any pharmacy     Bring a paper prescription for each of these medications     ciprofloxacin HCl 500 MG tablet    hydrocodone-acetaminophen 5-325mg 5-325 mg per tablet    phenazopyridine 100 MG tablet                  Please bring to all follow up appointments:    1. A copy of your discharge instructions.  2. All medicines you are currently taking in their original bottles.  3. Identification and insurance card.    Please arrive 15 minutes ahead of scheduled appointment time.    Please call 24 hours in advance if you must reschedule your appointment and/or time.        Your Scheduled Appointments     Apr 17, 2017 10:00 AM CDT   Established Patient Visit with Ifrah Falk MD   [facility] - Urology ([facility])    [address] [facility] [address]  [facility] [id number] [phone]            Anuj  29, 2017  2:00 PM CDT   Established Patient Visit with Taqueria Cooper MD   [facility] - OB/ GYN ([facility])    [address] [facility] [address]  [facility] [address] [phone]              Follow-up Information     Follow up with Ifrah Falk MD In 2 weeks.    Specialty:  Urology    Contact information:    [address]  [address]  [facility] [id number] [phone]          Discharge Instructions     Future Orders    Activity as tolerated     Call MD for:  difficulty breathing, headache or visual disturbances     Call MD for:  persistent nausea and vomiting     Call MD for:  severe uncontrolled pain     Call MD for:  temperature >100.4     Diet general     Questions:    Total calories:      Fat restriction, if any:      Protein restriction, if any:      Na restriction, if any:      Fluid restriction:      Additional restrictions:      No dressing needed         Discharge Instructions       Expect blood and/or burning with urination. Drink plenty of fluids.    Resume aspirin in 3 days if urine is clear (non-bloody).       ACTIVITY LEVEL: If you have received sedation or an anesthetic, you may feel sleepy for several hours. Rest until you are more awake. Gradually resume your normal activities.       DIET: You may resume your home diet. If nausea is present, increase your diet gradually with fluids and bland foods.      Medications: Pain medication should be taken only if needed and as directed. If antibiotics are prescribed, the medication should be taken until completed. You will be given an updated list of you medications.  ? No driving, alcoholic beverages or signing legal documents for next 24 hours or while taking pain medication        CALL THE DOCTOR:       · Fever over 101°F  · Severe pain that doesnt go away with medication.  · Upset stomach and vomiting that is persistent.  · Problems urinating-unable to urinate or heavy bleeding (with or without clots)    Fall Prevention  Millions of  people fall every year and injure themselves. You may have had anesthesia or sedation which may increase your risk of falling. You may have health issues that put you at an increased risk of falling.     Here are ways to reduce your risk of falling.  ·   · Make your home safe by keeping walkways clear of objects you may trip over.  · Use non-slip pads under rugs. Do not use area rugs or small throw rugs.  · Use non-slip mats in bathtubs and showers.  · Install handrails and lights on staircases.  · Do not walk in poorly lit areas.  · Do not stand on chairs or wobbly ladders.  · Use caution when reaching overhead or looking upward. This position can cause a loss of balance.  · Be sure your shoes fit properly, have non-slip bottoms and are in good condition.   · Wear shoes both inside and out. Avoid going barefoot or wearing slippers.  · Be cautious when going up and down stairs, curbs, and when walking on uneven sidewalks.  · If your balance is poor, consider using a cane or walker.  · If your fall was related to alcohol use, stop or limit alcohol intake.   · If your fall was related to use of sleeping medicines, talk to your doctor about this. You may need to reduce your dosage at bedtime if you awaken during the night to go to the bathroom.    · To reduce the need for nighttime bathroom trips:  ¨ Avoid drinking fluids for several hours before going to bed  ¨ Empty your bladder before going to bed  ¨ Men can keep a urinal at the bedside  · Stay as active as you can. Balance, flexibility, strength, and endurance all come from exercise. They all play a role in preventing falls. Ask your healthcare provider which types of activity are right for you.  · Get your vision checked on a regular basis.  · If you have pets, know where they are before you stand up or walk so you don't trip over them.  · Use night lights.         Discharge References/Attachments     BIOPSY, TRANSURETHRAL BLADDER (ENGLISH)    PHENAZOPYRIDINE  "TABLETS (ENGLISH)    ACETAMINOPHEN; HYDROCODONE TABLETS OR CAPSULES (ENGLISH)        Primary Diagnosis     Your primary diagnosis was:  Cancer Of Bladder      Admission Information     Date & Time Provider Department CSN    4/7/2017 10:40 AM Ifrah Falk MD [facility] [id number]      Care Providers     Provider Role Specialty Primary office phone    Ifrah Falk MD Attending Provider Urology [phone]    Ifrah Falk MD Surgeon  Urology [phone]      Your Vitals Were     BP Pulse Temp Resp Height Weight    140/84 (BP Location: Left arm, Patient Position: Lying, BP Method: Automatic) 88 98 °F (36.7 °C) (Oral) 18 5' 2" (1.575 m) 77.1 kg (170 lb)    SpO2 BMI             100% 31.09 kg/m2         Recent Lab Values     No lab values to display.      Pending Labs     Order Current Status    Specimen to Pathology - Surgery Collected (04/07/17 1602)      Allergies as of 4/7/2017        Reactions    Codeine     Patient can't remember    Keflex [Cephalexin]       Advance Directives     An advance directive is a document which, in the event you are no longer able to make decisions for yourself, tells your healthcare team what kind of treatment you do or do not want to receive, or who you would like to make those decisions for you.  If you do not currently have an advance directive, [facility] encourages you to create one.  For more information call:  [phone] ([phone]), [phone] ([phone]),  or log on to [email or web address].        Language Assistance Services     ATTENTION: Language assistance services are available, free of charge. Please call [phone].      ATENCIÓN: Si habla español, tiene a teran disposición servicios gratuitos de asistencia lingüística. Llame al [phone].     CHÚ Ý: N?u b?n nói Ti?ng Vi?t, có các d?ch v? h? tr? ngôn ng? mi?n phí dành cho b?n. G?i s? [phone].        [facility] Sign-Up     Activating your [facility] account " is as easy as 1-2-3!     1) Visit [email or web address], select Sign Up Now, enter this activation code and your date of birth, then select Next.  [email or web address]  Expires: 4/10/2017  3:11 PM      2) Create a username and password to use when you visit [facility] in the future and select a security question in case you lose your password and select Next.    3) Enter your e-mail address and click Sign Up!    Additional Information  If you have questions, please e-mail [email or web address] or call [phone] to talk to our [email or web address] staff. Remember, [facility] is NOT to be used for urgent needs. For medical emergencies, dial 911.          [facility] complies with applicable Federal civil rights laws and does not discriminate on the basis of race, color, national origin, age, disability, or sex.         3-point gait

## 2018-10-17 ENCOUNTER — HOSPITAL ENCOUNTER (OUTPATIENT)
Dept: PREADMISSION TESTING | Facility: HOSPITAL | Age: 83
Discharge: HOME | End: 2018-10-17
Attending: ORTHOPAEDIC SURGERY
Payer: MEDICARE

## 2018-10-17 VITALS
TEMPERATURE: 98 F | DIASTOLIC BLOOD PRESSURE: 78 MMHG | SYSTOLIC BLOOD PRESSURE: 134 MMHG | WEIGHT: 160.5 LBS | HEIGHT: 62 IN | RESPIRATION RATE: 17 BRPM | HEART RATE: 67 BPM | BODY MASS INDEX: 29.53 KG/M2 | OXYGEN SATURATION: 97 %

## 2018-10-17 DIAGNOSIS — C67.2 MALIGNANT NEOPLASM OF LATERAL WALL OF URINARY BLADDER: ICD-10-CM

## 2018-10-17 DIAGNOSIS — Z01.818 PRE-OP TESTING: Primary | ICD-10-CM

## 2018-10-17 DIAGNOSIS — K90.9 INTESTINAL MALABSORPTION, UNSPECIFIED TYPE: ICD-10-CM

## 2018-10-17 DIAGNOSIS — E66.9 OBESITY, UNSPECIFIED CLASSIFICATION, UNSPECIFIED OBESITY TYPE, UNSPECIFIED WHETHER SERIOUS COMORBIDITY PRESENT: ICD-10-CM

## 2018-10-17 DIAGNOSIS — M25.50 ARTHRALGIA, UNSPECIFIED JOINT: ICD-10-CM

## 2018-10-17 DIAGNOSIS — Z79.890 HORMONE REPLACEMENT THERAPY (HRT): ICD-10-CM

## 2018-10-17 DIAGNOSIS — Z90.710 STATUS POST HYSTERECTOMY: ICD-10-CM

## 2018-10-17 DIAGNOSIS — G89.29 CHRONIC PAIN OF LEFT KNEE: ICD-10-CM

## 2018-10-17 DIAGNOSIS — M25.562 CHRONIC PAIN OF LEFT KNEE: ICD-10-CM

## 2018-10-17 DIAGNOSIS — R79.9 ABNORMAL FINDING OF BLOOD CHEMISTRY: ICD-10-CM

## 2018-10-17 DIAGNOSIS — C67.9 BLADDER CARCINOMA: ICD-10-CM

## 2018-10-17 LAB
ANION GAP SERPL CALC-SCNC: 6 MMOL/L
BASOPHILS # BLD AUTO: 0.08 K/UL
BASOPHILS NFR BLD: 1.2 %
BILIRUB UR QL STRIP: NEGATIVE
BUN SERPL-MCNC: 14 MG/DL
CALCIUM SERPL-MCNC: 9.9 MG/DL
CHLORIDE SERPL-SCNC: 106 MMOL/L
CLARITY UR: ABNORMAL
CO2 SERPL-SCNC: 27 MMOL/L
COLOR UR: ABNORMAL
CREAT SERPL-MCNC: 0.8 MG/DL
DIFFERENTIAL METHOD: ABNORMAL
EOSINOPHIL # BLD AUTO: 0.1 K/UL
EOSINOPHIL NFR BLD: 2 %
ERYTHROCYTE [DISTWIDTH] IN BLOOD BY AUTOMATED COUNT: 12.9 %
EST. GFR  (AFRICAN AMERICAN): >60 ML/MIN/1.73 M^2
EST. GFR  (NON AFRICAN AMERICAN): >60 ML/MIN/1.73 M^2
ESTIMATED AVG GLUCOSE: 103 MG/DL
GLUCOSE SERPL-MCNC: 97 MG/DL
GLUCOSE UR QL STRIP: NEGATIVE
HBA1C MFR BLD HPLC: 5.2 %
HCT VFR BLD AUTO: 42.2 %
HGB BLD-MCNC: 13.9 G/DL
HGB UR QL STRIP: NEGATIVE
KETONES UR QL STRIP: NEGATIVE
LEUKOCYTE ESTERASE UR QL STRIP: ABNORMAL
LYMPHOCYTES # BLD AUTO: 2.6 K/UL
LYMPHOCYTES NFR BLD: 39.5 %
MCH RBC QN AUTO: 31.6 PG
MCHC RBC AUTO-ENTMCNC: 32.9 G/DL
MCV RBC AUTO: 96 FL
MICROSCOPIC COMMENT: NORMAL
MONOCYTES # BLD AUTO: 0.5 K/UL
MONOCYTES NFR BLD: 7.9 %
NEUTROPHILS # BLD AUTO: 3.2 K/UL
NEUTROPHILS NFR BLD: 49.4 %
NITRITE UR QL STRIP: NEGATIVE
PH UR STRIP: 6 [PH] (ref 5–8)
PLATELET # BLD AUTO: 162 K/UL
PMV BLD AUTO: 10.5 FL
POTASSIUM SERPL-SCNC: 4.1 MMOL/L
PROT UR QL STRIP: NEGATIVE
RBC # BLD AUTO: 4.4 M/UL
SODIUM SERPL-SCNC: 139 MMOL/L
SP GR UR STRIP: 1 (ref 1–1.03)
URN SPEC COLLECT METH UR: ABNORMAL
UROBILINOGEN UR STRIP-ACNC: NEGATIVE EU/DL
WBC # BLD AUTO: 6.48 K/UL
WBC #/AREA URNS HPF: 3 /HPF (ref 0–5)

## 2018-10-17 PROCEDURE — 80048 BASIC METABOLIC PNL TOTAL CA: CPT

## 2018-10-17 PROCEDURE — 85025 COMPLETE CBC W/AUTO DIFF WBC: CPT

## 2018-10-17 PROCEDURE — 36415 COLL VENOUS BLD VENIPUNCTURE: CPT

## 2018-10-17 PROCEDURE — 83036 HEMOGLOBIN GLYCOSYLATED A1C: CPT

## 2018-10-17 PROCEDURE — 81000 URINALYSIS NONAUTO W/SCOPE: CPT

## 2018-10-17 RX ORDER — NAPROXEN SODIUM 220 MG
220 TABLET ORAL 2 TIMES DAILY WITH MEALS
COMMUNITY

## 2018-10-17 RX ORDER — FLUTICASONE PROPIONATE 50 MCG
1 SPRAY, SUSPENSION (ML) NASAL DAILY PRN
COMMUNITY

## 2018-10-17 RX ORDER — BIOTIN 1 MG
1000 TABLET ORAL 3 TIMES DAILY
COMMUNITY

## 2018-10-17 RX ORDER — ROSUVASTATIN CALCIUM 5 MG/1
5 TABLET, COATED ORAL DAILY
COMMUNITY

## 2018-10-17 RX ORDER — CALCIUM CARBONATE 600 MG
600 TABLET ORAL ONCE
COMMUNITY

## 2018-10-17 RX ORDER — DEXTROMETHORPHAN HYDROBROMIDE, GUAIFENESIN 5; 100 MG/5ML; MG/5ML
650 LIQUID ORAL EVERY 8 HOURS PRN
COMMUNITY

## 2018-10-17 RX ORDER — ACETAMINOPHEN 500 MG
1 TABLET ORAL NIGHTLY PRN
COMMUNITY

## 2018-10-17 RX ORDER — CLOTRIMAZOLE AND BETAMETHASONE DIPROPIONATE 10; .64 MG/G; MG/G
CREAM TOPICAL 2 TIMES DAILY
COMMUNITY

## 2018-10-17 RX ORDER — ASPIRIN 81 MG/1
81 TABLET ORAL DAILY
Status: ON HOLD | COMMUNITY
End: 2018-10-22 | Stop reason: HOSPADM

## 2018-10-17 NOTE — DISCHARGE INSTRUCTIONS
Your surgery is scheduled for __Monday Oct. 22, 2018____.    Call [phone] between 2 p.m. and 5 p.m. on   _Friday___ to find out your arrival time for the day of your surgery.      Please report to SAME DAY SURGERY UNIT on the 2nd FLOOR at _______ a.m.  Use front door entrance. The doors open at 0530 am.          INSTRUCTIONS IMPORTANT!!!  ¨ Do not eat or drink after 12 midnight-including water. OK to brush teeth, no   gum, candy or mints!    ¨ Take only these medicines with a small swallow of water-morning of surgery.  Take Levothyroxine with swallow of water morning of surgery.    STOP VITAMIN E, ALEVE, IBUPROFEN UNTIL AFTER SURGERY.      _X___  Prep instructions:   SHOWER    _X___  Please shower using Hibiclens soap the night before AND  the morning of your surgery/procedure. Do not use Hibiclens on your face or genitals               If your surgery is around your belly button (Navel) be sure to wash inside your belly button also. Rinse hibiclens off completely.  _X___  No shaving of procedural area at least 4-5 days before surgery due to  increased risk of skin irritation and/or possible infection.  _X___  Do not wear makeup, including mascara. WEARING EYE MAKEUP MAY LEAD TO SERIOUS EYE INJURY during surgery.  _X___  No powder, lotions or creams to your body.  _X___  You may wear only deodorant on the day of surgery.  _X___  Please remove all jewelry, including piercings and leave at home.  _X___  No money or valuables needed. Please leave at home.  You may bring your cell phone.  ____  Please bring any documents given by your doctor.  ____  If going home the same day, arrange for a ride home. You will not be able to   drive if Anesthesia was used.  ____  Children under 18 years require a parent / guardian present the entire time   they are in surgery / recovery.  X____  Wear loose fitting clothing. Allow for dressings, bandages.  _X___  Stop  Ibuprofen, Motrin and Aleve at least 3-5 days before surgery,  unless otherwise instructed by your doctor, or the nurse.      X        You MAY use Tylenol/acetaminophen until day of surgery.  ____  If you take diabetic medication, do not take am of surgery unless instructed by   Doctor.  __X__  Call MD for temperature above 101 degrees.        _X___ Stop taking any Fish Oil supplement or any Vitamins that contain Vitamin  E at least 5 days prior to surgery.            I have read or had read and explained to me, and understand the above information.  Additional comments or instructions:Please call   [phone] if you have any questions regarding the instructions above.

## 2018-10-22 ENCOUNTER — ANESTHESIA EVENT (OUTPATIENT)
Dept: SURGERY | Facility: HOSPITAL | Age: 83
DRG: 470 | End: 2018-10-22
Payer: MEDICARE

## 2018-10-22 ENCOUNTER — ANESTHESIA (OUTPATIENT)
Dept: SURGERY | Facility: HOSPITAL | Age: 83
DRG: 470 | End: 2018-10-22
Payer: MEDICARE

## 2018-10-22 ENCOUNTER — HOSPITAL ENCOUNTER (INPATIENT)
Facility: HOSPITAL | Age: 83
LOS: 1 days | Discharge: HOME HEALTH | DRG: 470 | End: 2018-10-23
Attending: ORTHOPAEDIC SURGERY | Admitting: ORTHOPAEDIC SURGERY
Payer: MEDICARE

## 2018-10-22 DIAGNOSIS — M17.12 PRIMARY OSTEOARTHRITIS OF LEFT KNEE: Primary | ICD-10-CM

## 2018-10-22 LAB
ANION GAP SERPL CALC-SCNC: 7 MMOL/L
BUN SERPL-MCNC: 11 MG/DL
CALCIUM SERPL-MCNC: 9.9 MG/DL
CHLORIDE SERPL-SCNC: 107 MMOL/L
CO2 SERPL-SCNC: 27 MMOL/L
CREAT SERPL-MCNC: 0.8 MG/DL
EST. GFR  (AFRICAN AMERICAN): >60 ML/MIN/1.73 M^2
EST. GFR  (NON AFRICAN AMERICAN): >60 ML/MIN/1.73 M^2
GLUCOSE SERPL-MCNC: 106 MG/DL
HCT VFR BLD AUTO: 43.4 %
HGB BLD-MCNC: 13.7 G/DL
POTASSIUM SERPL-SCNC: 4.4 MMOL/L
SODIUM SERPL-SCNC: 141 MMOL/L

## 2018-10-22 PROCEDURE — 36000710: Performed by: ORTHOPAEDIC SURGERY

## 2018-10-22 PROCEDURE — 63600175 PHARM REV CODE 636 W HCPCS: Performed by: NURSE ANESTHETIST, CERTIFIED REGISTERED

## 2018-10-22 PROCEDURE — S0020 INJECTION, BUPIVICAINE HYDRO: HCPCS | Performed by: ANESTHESIOLOGY

## 2018-10-22 PROCEDURE — C1776 JOINT DEVICE (IMPLANTABLE): HCPCS | Performed by: ORTHOPAEDIC SURGERY

## 2018-10-22 PROCEDURE — 11000001 HC ACUTE MED/SURG PRIVATE ROOM

## 2018-10-22 PROCEDURE — 85018 HEMOGLOBIN: CPT

## 2018-10-22 PROCEDURE — C9290 INJ, BUPIVACAINE LIPOSOME: HCPCS | Performed by: ORTHOPAEDIC SURGERY

## 2018-10-22 PROCEDURE — 63600175 PHARM REV CODE 636 W HCPCS: Performed by: ORTHOPAEDIC SURGERY

## 2018-10-22 PROCEDURE — D9220A PRA ANESTHESIA: Mod: ANES,,, | Performed by: ANESTHESIOLOGY

## 2018-10-22 PROCEDURE — 85014 HEMATOCRIT: CPT

## 2018-10-22 PROCEDURE — 80048 BASIC METABOLIC PNL TOTAL CA: CPT

## 2018-10-22 PROCEDURE — C1713 ANCHOR/SCREW BN/BN,TIS/BN: HCPCS | Performed by: ORTHOPAEDIC SURGERY

## 2018-10-22 PROCEDURE — 63600175 PHARM REV CODE 636 W HCPCS: Performed by: ANESTHESIOLOGY

## 2018-10-22 PROCEDURE — S0077 INJECTION, CLINDAMYCIN PHOSP: HCPCS | Performed by: ORTHOPAEDIC SURGERY

## 2018-10-22 PROCEDURE — 27201423 OPTIME MED/SURG SUP & DEVICES STERILE SUPPLY: Performed by: ORTHOPAEDIC SURGERY

## 2018-10-22 PROCEDURE — 37000008 HC ANESTHESIA 1ST 15 MINUTES: Performed by: ORTHOPAEDIC SURGERY

## 2018-10-22 PROCEDURE — 25000003 PHARM REV CODE 250: Performed by: ORTHOPAEDIC SURGERY

## 2018-10-22 PROCEDURE — 36000711: Performed by: ORTHOPAEDIC SURGERY

## 2018-10-22 PROCEDURE — D9220A PRA ANESTHESIA: Mod: CRNA,,, | Performed by: NURSE ANESTHETIST, CERTIFIED REGISTERED

## 2018-10-22 PROCEDURE — 36415 COLL VENOUS BLD VENIPUNCTURE: CPT

## 2018-10-22 PROCEDURE — 71000033 HC RECOVERY, INTIAL HOUR: Performed by: ORTHOPAEDIC SURGERY

## 2018-10-22 PROCEDURE — 71000039 HC RECOVERY, EACH ADD'L HOUR: Performed by: ORTHOPAEDIC SURGERY

## 2018-10-22 PROCEDURE — 25000003 PHARM REV CODE 250: Performed by: ANESTHESIOLOGY

## 2018-10-22 PROCEDURE — 0SRD0J9 REPLACEMENT OF LEFT KNEE JOINT WITH SYNTHETIC SUBSTITUTE, CEMENTED, OPEN APPROACH: ICD-10-PCS | Performed by: ORTHOPAEDIC SURGERY

## 2018-10-22 PROCEDURE — 37000009 HC ANESTHESIA EA ADD 15 MINS: Performed by: ORTHOPAEDIC SURGERY

## 2018-10-22 DEVICE — CEMENT REFOBACIN BCR 1X40: Type: IMPLANTABLE DEVICE | Site: KNEE | Status: FUNCTIONAL

## 2018-10-22 DEVICE — INSERT TIBIAL 3MM LEFT UHMWPE: Type: IMPLANTABLE DEVICE | Site: KNEE | Status: FUNCTIONAL

## 2018-10-22 DEVICE — FEMORAL SM PART KNEE OXFORD: Type: IMPLANTABLE DEVICE | Site: KNEE | Status: FUNCTIONAL

## 2018-10-22 RX ORDER — LEVOTHYROXINE SODIUM 100 UG/1
100 TABLET ORAL
Status: DISCONTINUED | OUTPATIENT
Start: 2018-10-23 | End: 2018-10-23 | Stop reason: HOSPADM

## 2018-10-22 RX ORDER — DEXAMETHASONE SODIUM PHOSPHATE 4 MG/ML
8 INJECTION, SOLUTION INTRA-ARTICULAR; INTRALESIONAL; INTRAMUSCULAR; INTRAVENOUS; SOFT TISSUE EVERY 24 HOURS
Status: DISCONTINUED | OUTPATIENT
Start: 2018-10-22 | End: 2018-10-23 | Stop reason: HOSPADM

## 2018-10-22 RX ORDER — ASPIRIN 325 MG
325 TABLET ORAL DAILY
Qty: 30 TABLET | Refills: 0 | Status: SHIPPED | OUTPATIENT
Start: 2018-10-22 | End: 2022-09-21

## 2018-10-22 RX ORDER — TRANEXAMIC ACID 100 MG/ML
1000 INJECTION, SOLUTION INTRAVENOUS ONCE
Status: COMPLETED | OUTPATIENT
Start: 2018-10-22 | End: 2018-10-22

## 2018-10-22 RX ORDER — OXYCODONE AND ACETAMINOPHEN 5; 325 MG/1; MG/1
1-2 TABLET ORAL EVERY 4 HOURS PRN
Qty: 60 TABLET | Refills: 0 | Status: SHIPPED | OUTPATIENT
Start: 2018-10-22 | End: 2022-09-21

## 2018-10-22 RX ORDER — SODIUM CHLORIDE 9 MG/ML
INJECTION, SOLUTION INTRAVENOUS CONTINUOUS
Status: DISCONTINUED | OUTPATIENT
Start: 2018-10-22 | End: 2018-10-23 | Stop reason: HOSPADM

## 2018-10-22 RX ORDER — FENTANYL CITRATE 50 UG/ML
25 INJECTION, SOLUTION INTRAMUSCULAR; INTRAVENOUS EVERY 5 MIN PRN
Status: DISCONTINUED | OUTPATIENT
Start: 2018-10-22 | End: 2018-10-23 | Stop reason: HOSPADM

## 2018-10-22 RX ORDER — OXYCODONE HYDROCHLORIDE 5 MG/1
10 TABLET ORAL
Status: DISCONTINUED | OUTPATIENT
Start: 2018-10-22 | End: 2018-10-23 | Stop reason: HOSPADM

## 2018-10-22 RX ORDER — DOCUSATE SODIUM 100 MG/1
100 CAPSULE, LIQUID FILLED ORAL 2 TIMES DAILY
Status: DISCONTINUED | OUTPATIENT
Start: 2018-10-22 | End: 2018-10-23 | Stop reason: HOSPADM

## 2018-10-22 RX ORDER — DOCUSATE SODIUM 100 MG/1
100 CAPSULE, LIQUID FILLED ORAL 2 TIMES DAILY PRN
Qty: 60 CAPSULE | Refills: 0 | Status: SHIPPED | OUTPATIENT
Start: 2018-10-22 | End: 2022-09-21

## 2018-10-22 RX ORDER — BUPIVACAINE HYDROCHLORIDE 2.5 MG/ML
INJECTION, SOLUTION EPIDURAL; INFILTRATION; INTRACAUDAL
Status: DISCONTINUED | OUTPATIENT
Start: 2018-10-22 | End: 2018-10-22 | Stop reason: HOSPADM

## 2018-10-22 RX ORDER — ASPIRIN 325 MG
325 TABLET ORAL DAILY
Status: DISCONTINUED | OUTPATIENT
Start: 2018-10-23 | End: 2018-10-23 | Stop reason: HOSPADM

## 2018-10-22 RX ORDER — ONDANSETRON 2 MG/ML
4 INJECTION INTRAMUSCULAR; INTRAVENOUS ONCE AS NEEDED
Status: ACTIVE | OUTPATIENT
Start: 2018-10-22 | End: 2018-10-22

## 2018-10-22 RX ORDER — SODIUM CHLORIDE 0.9 % (FLUSH) 0.9 %
3 SYRINGE (ML) INJECTION
Status: DISCONTINUED | OUTPATIENT
Start: 2018-10-22 | End: 2018-10-23 | Stop reason: HOSPADM

## 2018-10-22 RX ORDER — ROSUVASTATIN CALCIUM 5 MG/1
5 TABLET, COATED ORAL DAILY
Status: DISCONTINUED | OUTPATIENT
Start: 2018-10-23 | End: 2018-10-23 | Stop reason: HOSPADM

## 2018-10-22 RX ORDER — VANCOMYCIN HCL IN 5 % DEXTROSE 1G/250ML
1000 PLASTIC BAG, INJECTION (ML) INTRAVENOUS
Status: DISCONTINUED | OUTPATIENT
Start: 2018-10-22 | End: 2018-10-22 | Stop reason: HOSPADM

## 2018-10-22 RX ORDER — BUPIVACAINE HYDROCHLORIDE 5 MG/ML
INJECTION, SOLUTION EPIDURAL; INTRACAUDAL
Status: COMPLETED | OUTPATIENT
Start: 2018-10-22 | End: 2018-10-22

## 2018-10-22 RX ORDER — CLINDAMYCIN PHOSPHATE 600 MG/50ML
600 INJECTION, SOLUTION INTRAVENOUS
Status: DISCONTINUED | OUTPATIENT
Start: 2018-10-22 | End: 2018-10-22 | Stop reason: HOSPADM

## 2018-10-22 RX ORDER — PROPOFOL 10 MG/ML
VIAL (ML) INTRAVENOUS
Status: DISCONTINUED | OUTPATIENT
Start: 2018-10-22 | End: 2018-10-22

## 2018-10-22 RX ORDER — SODIUM CHLORIDE, SODIUM LACTATE, POTASSIUM CHLORIDE, CALCIUM CHLORIDE 600; 310; 30; 20 MG/100ML; MG/100ML; MG/100ML; MG/100ML
INJECTION, SOLUTION INTRAVENOUS CONTINUOUS
Status: DISCONTINUED | OUTPATIENT
Start: 2018-10-22 | End: 2018-10-23

## 2018-10-22 RX ORDER — CLINDAMYCIN PHOSPHATE 900 MG/50ML
900 INJECTION, SOLUTION INTRAVENOUS
Status: DISCONTINUED | OUTPATIENT
Start: 2018-10-22 | End: 2018-10-23 | Stop reason: HOSPADM

## 2018-10-22 RX ORDER — ACETAMINOPHEN 10 MG/ML
1000 INJECTION, SOLUTION INTRAVENOUS EVERY 6 HOURS
Status: COMPLETED | OUTPATIENT
Start: 2018-10-22 | End: 2018-10-23

## 2018-10-22 RX ORDER — DEXAMETHASONE SODIUM PHOSPHATE 4 MG/ML
INJECTION, SOLUTION INTRA-ARTICULAR; INTRALESIONAL; INTRAMUSCULAR; INTRAVENOUS; SOFT TISSUE
Status: DISCONTINUED | OUTPATIENT
Start: 2018-10-22 | End: 2018-10-22

## 2018-10-22 RX ORDER — FENTANYL CITRATE 50 UG/ML
INJECTION, SOLUTION INTRAMUSCULAR; INTRAVENOUS
Status: DISCONTINUED | OUTPATIENT
Start: 2018-10-22 | End: 2018-10-22

## 2018-10-22 RX ORDER — MORPHINE SULFATE 4 MG/ML
2 INJECTION, SOLUTION INTRAMUSCULAR; INTRAVENOUS
Status: DISCONTINUED | OUTPATIENT
Start: 2018-10-22 | End: 2018-10-23 | Stop reason: HOSPADM

## 2018-10-22 RX ORDER — ESOMEPRAZOLE MAGNESIUM 40 MG/1
40 CAPSULE, DELAYED RELEASE ORAL
Qty: 30 CAPSULE | Refills: 11 | Status: SHIPPED | OUTPATIENT
Start: 2018-10-22 | End: 2022-09-21

## 2018-10-22 RX ORDER — TRANEXAMIC ACID 100 MG/ML
1000 INJECTION, SOLUTION INTRAVENOUS ONCE
Status: DISCONTINUED | OUTPATIENT
Start: 2018-10-22 | End: 2018-10-23 | Stop reason: HOSPADM

## 2018-10-22 RX ORDER — ONDANSETRON 2 MG/ML
INJECTION INTRAMUSCULAR; INTRAVENOUS
Status: DISCONTINUED | OUTPATIENT
Start: 2018-10-22 | End: 2018-10-22

## 2018-10-22 RX ORDER — BACITRACIN 50000 [IU]/1
INJECTION, POWDER, FOR SOLUTION INTRAMUSCULAR
Status: DISCONTINUED | OUTPATIENT
Start: 2018-10-22 | End: 2018-10-22 | Stop reason: HOSPADM

## 2018-10-22 RX ADMIN — FENTANYL CITRATE 10 MCG: 50 INJECTION INTRAMUSCULAR; INTRAVENOUS at 12:10

## 2018-10-22 RX ADMIN — FENTANYL CITRATE 25 MCG: 50 INJECTION INTRAMUSCULAR; INTRAVENOUS at 01:10

## 2018-10-22 RX ADMIN — PROPOFOL 20 MG: 10 INJECTION, EMULSION INTRAVENOUS at 02:10

## 2018-10-22 RX ADMIN — ACETAMINOPHEN 1000 MG: 10 INJECTION, SOLUTION INTRAVENOUS at 11:10

## 2018-10-22 RX ADMIN — DEXAMETHASONE SODIUM PHOSPHATE 10 MG: 4 INJECTION, SOLUTION INTRAMUSCULAR; INTRAVENOUS at 01:10

## 2018-10-22 RX ADMIN — CLINDAMYCIN IN 5 PERCENT DEXTROSE 900 MG: 18 INJECTION, SOLUTION INTRAVENOUS at 08:10

## 2018-10-22 RX ADMIN — PROPOFOL 10 MG: 10 INJECTION, EMULSION INTRAVENOUS at 12:10

## 2018-10-22 RX ADMIN — CLINDAMYCIN PHOSPHATE 600 MG: 12 INJECTION, SOLUTION INTRAVENOUS at 12:10

## 2018-10-22 RX ADMIN — BUPIVACAINE HYDROCHLORIDE 2.6 ML: 5 INJECTION, SOLUTION EPIDURAL; INTRACAUDAL; PERINEURAL at 12:10

## 2018-10-22 RX ADMIN — PROPOFOL 20 MG: 10 INJECTION, EMULSION INTRAVENOUS at 01:10

## 2018-10-22 RX ADMIN — SODIUM CHLORIDE: 0.9 INJECTION, SOLUTION INTRAVENOUS at 04:10

## 2018-10-22 RX ADMIN — ACETAMINOPHEN 1000 MG: 10 INJECTION, SOLUTION INTRAVENOUS at 05:10

## 2018-10-22 RX ADMIN — FENTANYL CITRATE 15 MCG: 50 INJECTION INTRAMUSCULAR; INTRAVENOUS at 12:10

## 2018-10-22 RX ADMIN — SODIUM CHLORIDE, SODIUM LACTATE, POTASSIUM CHLORIDE, AND CALCIUM CHLORIDE: .6; .31; .03; .02 INJECTION, SOLUTION INTRAVENOUS at 11:10

## 2018-10-22 RX ADMIN — TRANEXAMIC ACID 1 G: 100 INJECTION, SOLUTION INTRAVENOUS at 02:10

## 2018-10-22 RX ADMIN — FENTANYL CITRATE 25 MCG: 50 INJECTION INTRAMUSCULAR; INTRAVENOUS at 02:10

## 2018-10-22 RX ADMIN — FENTANYL CITRATE 50 MCG: 50 INJECTION INTRAMUSCULAR; INTRAVENOUS at 12:10

## 2018-10-22 RX ADMIN — PREGABALIN 75 MG: 50 CAPSULE ORAL at 08:10

## 2018-10-22 RX ADMIN — TRANEXAMIC ACID 1 G: 100 INJECTION, SOLUTION INTRAVENOUS at 01:10

## 2018-10-22 RX ADMIN — Medication 1000 MG: at 01:10

## 2018-10-22 RX ADMIN — ONDANSETRON 4 MG: 2 INJECTION, SOLUTION INTRAMUSCULAR; INTRAVENOUS at 12:10

## 2018-10-22 RX ADMIN — DOCUSATE SODIUM 100 MG: 100 CAPSULE, LIQUID FILLED ORAL at 08:10

## 2018-10-22 NOTE — ANESTHESIA PROCEDURE NOTES
Spinal    Diagnosis: Arthritis  Patient location during procedure: holding area  Start time: 10/22/2018 12:25 PM  Timeout: 10/22/2018 12:25 PM  End time: 10/22/2018 12:38 PM  Staffing  Anesthesiologist: Tate Nobles MD  Preanesthetic Checklist  Completed: patient identified, site marked, surgical consent, pre-op evaluation, timeout performed, IV checked, risks and benefits discussed and monitors and equipment checked  Spinal Block  Patient position: sitting  Prep: ChloraPrep  Patient monitoring: heart rate, continuous pulse ox and cardiac monitor  Approach: midline  Location: L4-5  Injection technique: single shot  CSF Fluid: clear free-flowing CSF  Needle  Needle type: Quincke   Needle gauge: 25 G  Needle length: 5 in  Needle localization: anatomical landmarks  Assessment  Sensory level: T6   Dermatomal levels determined by alcohol wipe  Patient's tolerance of the procedure: comfortable throughout block

## 2018-10-22 NOTE — TRANSFER OF CARE
"Anesthesia Transfer of Care Note    Patient: Ella Sarmiento    Procedure(s) Performed: Procedure(s) (LRB):  ARTHROPLASTY, KNEE, UNICOMPARTMENTAL (Left)    Patient location: PACU    Anesthesia Type: spinal    Transport from OR: Transported from OR on room air with adequate spontaneous ventilation    Post pain: adequate analgesia    Post assessment: no apparent anesthetic complications and tolerated procedure well    Post vital signs: stable    Level of consciousness: awake, alert and oriented    Nausea/Vomiting: no nausea/vomiting    Complications: none    Transfer of care protocol was followed      Last vitals:   Visit Vitals  BP (!) 166/90   Pulse 72   Temp 36.4 °C (97.5 °F) (Oral)   Resp 16   Ht 5' 2" (1.575 m)   Wt 72.8 kg (160 lb 7 oz)   SpO2 98%   Breastfeeding? No   BMI 29.34 kg/m²     "

## 2018-10-22 NOTE — BRIEF OP NOTE
[facility]  Brief Operative Note    SUMMARY     Surgery Date: 10/22/2018     Surgeon(s) and Role:     * Franky Mendoza MD - Primary    Assisting Surgeon: None    Pre-op Diagnosis:  Primary osteoarthritis of left knee [M17.12]    Post-op Diagnosis:  Post-Op Diagnosis Codes:     * Primary osteoarthritis of left knee [M17.12]    Procedure(s) (LRB):  ARTHROPLASTY, KNEE, UNICOMPARTMENTAL (Left)    Anesthesia: General    Description of Procedure: Left knee Uni knee replacemnt    Description of the findings of the procedure: Left knee UKA    Estimated Blood Loss: * No values recorded between 10/22/2018  1:16 PM and 10/22/2018  2:40 PM *       25 cc  Specimens:   Specimen (12h ago, onward)    None

## 2018-10-22 NOTE — ANESTHESIA PREPROCEDURE EVALUATION
10/22/2018  Ella Sarmiento is a 83 y.o., female.    Anesthesia Evaluation    I have reviewed the Patient Summary Reports.    I have reviewed the Nursing Notes.   I have reviewed the Medications.     Review of Systems  Anesthesia Hx:  No problems with previous Anesthesia  History of prior surgery of interest to airway management or planning: Denies Family Hx of Anesthesia complications.   Denies Personal Hx of Anesthesia complications.   Social:  Non-Smoker    Cardiovascular:  Cardiovascular Normal       Pre-operative evaluation for Procedure(s) (LRB):  ARTHROPLASTY, KNEE, UNICOMPARTMENTAL (Left)    Ella Sarmiento is a 83 y.o. female     Patient Active Problem List   Diagnosis    Menopausal state    Hormone replacement therapy (HRT)    Status post hysterectomy    Bartholin cyst    H/O hematuria    Hematuria, gross    Malignant neoplasm of lateral wall of urinary bladder    Bladder carcinoma    Atrophic vaginitis    Primary osteoarthritis of left knee       Review of patient's allergies indicates:   Allergen Reactions    Codeine      Patient can't remember    Keflex [cephalexin]        No current facility-administered medications on file prior to encounter.      Current Outpatient Medications on File Prior to Encounter   Medication Sig Dispense Refill    azelaic acid (FINACEA) 15 % cream Apply topically 2 (two) times daily.      cyanocobalamin (B-12 DOTS) 500 MCG tablet Take 500 mcg by mouth once daily.      ERGOCALCIFEROL, VITAMIN D2, (VITAMIN D ORAL) Take 1 tablet by mouth once daily.      L GASSERI/B BIFIDUM/B LONGUM ([employer] HEALTH ORAL) Take 1 capsule by mouth daily as needed.       levothyroxine (SYNTHROID) 100 MCG tablet       multivit-mineral-iron-lutein (CENTRUM SILVER ULTRA WOMEN'S) Tab Take 1 tablet by mouth daily as needed.       cetirizine (ZYRTEC) 10 MG tablet Take  10 mg by mouth once daily.      dicyclomine (BENTYL) 10 MG capsule       DIPHENHYDRAMINE HCL (BENADRYL ALLERGY ORAL) Take 12.5 mg by mouth daily as needed.       FIBER CHOICE ORAL Take 1 tablet by mouth daily as needed.       phenazopyridine (PYRIDIUM) 100 MG tablet Take 2 tablets (200 mg total) by mouth 3 (three) times daily with meals. 18 tablet 0    vitamin E 600 UNIT capsule Take 600 Units by mouth once daily.         Past Surgical History:   Procedure Laterality Date    bladder cancer  2016    COLONOSCOPY      cysto bladder biopsy  with fulgeration  4/7/2017    [facility]    CYSTOSCOPY W/BLADDER BIOPSY,POSSIBLE FULGURATION-BLADDER, retrograde pyelogram Bilateral 4/13/2018    [facility] at [facility] OR    FULGURATION-BLADDER  4/7/2017    [facility] at [facility]    HYSTERECTOMY      JOINT REPLACEMENT      Rt total knee    KNEE SURGERY      Rt & Lt knees scoped    PYELOGRAM-RETROGRADE Bilateral 3/3/2017    [facility]    TUMOR-BLADDER-TRANSURETHRAL RESECTION, mitomycin instillation Bilateral 3/3/2017    [facility] by [facility] at [facility] OR       Social History     Socioeconomic History    Marital status:      Spouse name: Not on file    Number of children: Not on file    Years of education: Not on file    Highest education level: Not on file   Social Needs    Financial resource strain: Not on file    Food insecurity - worry: Not on file    Food insecurity - inability: Not on file    Transportation needs - medical: Not on file    Transportation needs - non-medical: Not on file   Occupational History    Not on file   Tobacco Use    Smoking status: Never Smoker    Smokeless tobacco: Never Used   Substance and Sexual Activity    Alcohol use: Yes     Comment: rarely    Drug use: No    Sexual activity: No     Partners: Male   Other Topics Concern     Not on file   Social History Narrative    Not on file         Lab Results   Component Value Date    WBC 6.48 10/17/2018    HGB 13.9 10/17/2018    HCT 42.2 10/17/2018    MCV 96 10/17/2018     10/17/2018     BMP  Lab Results   Component Value Date     10/17/2018    K 4.1 10/17/2018     10/17/2018    CO2 27 10/17/2018    BUN 14 10/17/2018    CREATININE 0.8 10/17/2018    CALCIUM 9.9 10/17/2018    ANIONGAP 6 (L) 10/17/2018    ESTGFRAFRICA >60 10/17/2018    EGFRNONAA >60 10/17/2018             Physical Exam  General:  Well nourished    Airway/Jaw/Neck:  Airway Findings: Mouth Opening: Normal Tongue: Normal  General Airway Assessment: Adult  Mallampati: II     Eyes/Ears/Nose:  Eyes/Ears/Nose Findings:    Dental:  Dental Findings:         Mental Status:  Mental Status Findings:  Cooperative, Alert and Oriented         Anesthesia Plan  Type of Anesthesia, risks & benefits discussed:  Anesthesia Type:  spinal, MAC  Patient's Preference: peripheral nerve block if needed.   Intra-op Monitoring Plan: standard ASA monitors  Intra-op Monitoring Plan Comments:   Post Op Pain Control Plan: multimodal analgesia, IV/PO Opioids PRN, per primary service following discharge from PACU, intrathecal opioid and peripheral nerve block  Post Op Pain Control Plan Comments:   Induction:   IV  Beta Blocker:  Patient is not currently on a Beta-Blocker (No further documentation required).       Informed Consent: Patient understands risks and agrees with Anesthesia plan.  Questions answered. Anesthesia consent signed with patient.  ASA Score: 2     Day of Surgery Review of History & Physical:     H&P completed by Anesthesiologist.       Ready For Surgery From Anesthesia Perspective.

## 2018-10-23 VITALS
BODY MASS INDEX: 33.29 KG/M2 | SYSTOLIC BLOOD PRESSURE: 107 MMHG | HEIGHT: 62 IN | RESPIRATION RATE: 18 BRPM | OXYGEN SATURATION: 95 % | HEART RATE: 69 BPM | DIASTOLIC BLOOD PRESSURE: 73 MMHG | TEMPERATURE: 98 F | WEIGHT: 180.88 LBS

## 2018-10-23 LAB
ANION GAP SERPL CALC-SCNC: 7 MMOL/L
BUN SERPL-MCNC: 13 MG/DL
CALCIUM SERPL-MCNC: 9.3 MG/DL
CHLORIDE SERPL-SCNC: 109 MMOL/L
CO2 SERPL-SCNC: 24 MMOL/L
CREAT SERPL-MCNC: 0.8 MG/DL
EST. GFR  (AFRICAN AMERICAN): >60 ML/MIN/1.73 M^2
EST. GFR  (NON AFRICAN AMERICAN): >60 ML/MIN/1.73 M^2
GLUCOSE SERPL-MCNC: 118 MG/DL
HCT VFR BLD AUTO: 41.4 %
HGB BLD-MCNC: 13.2 G/DL
POTASSIUM SERPL-SCNC: 4.2 MMOL/L
SODIUM SERPL-SCNC: 140 MMOL/L

## 2018-10-23 PROCEDURE — S0077 INJECTION, CLINDAMYCIN PHOSP: HCPCS | Performed by: ORTHOPAEDIC SURGERY

## 2018-10-23 PROCEDURE — 36415 COLL VENOUS BLD VENIPUNCTURE: CPT

## 2018-10-23 PROCEDURE — 97165 OT EVAL LOW COMPLEX 30 MIN: CPT

## 2018-10-23 PROCEDURE — 97161 PT EVAL LOW COMPLEX 20 MIN: CPT

## 2018-10-23 PROCEDURE — 80048 BASIC METABOLIC PNL TOTAL CA: CPT

## 2018-10-23 PROCEDURE — G8979 MOBILITY GOAL STATUS: HCPCS | Mod: CJ

## 2018-10-23 PROCEDURE — G8987 SELF CARE CURRENT STATUS: HCPCS | Mod: CJ

## 2018-10-23 PROCEDURE — 85018 HEMOGLOBIN: CPT

## 2018-10-23 PROCEDURE — 63600175 PHARM REV CODE 636 W HCPCS: Performed by: ORTHOPAEDIC SURGERY

## 2018-10-23 PROCEDURE — G8989 SELF CARE D/C STATUS: HCPCS | Mod: CJ

## 2018-10-23 PROCEDURE — G8978 MOBILITY CURRENT STATUS: HCPCS | Mod: CK

## 2018-10-23 PROCEDURE — 94761 N-INVAS EAR/PLS OXIMETRY MLT: CPT

## 2018-10-23 PROCEDURE — 25000003 PHARM REV CODE 250: Performed by: ORTHOPAEDIC SURGERY

## 2018-10-23 PROCEDURE — 85014 HEMATOCRIT: CPT

## 2018-10-23 PROCEDURE — G8988 SELF CARE GOAL STATUS: HCPCS | Mod: CJ

## 2018-10-23 PROCEDURE — 97530 THERAPEUTIC ACTIVITIES: CPT

## 2018-10-23 RX ADMIN — SODIUM CHLORIDE: 0.9 INJECTION, SOLUTION INTRAVENOUS at 01:10

## 2018-10-23 RX ADMIN — ACETAMINOPHEN 1000 MG: 10 INJECTION, SOLUTION INTRAVENOUS at 05:10

## 2018-10-23 RX ADMIN — CLINDAMYCIN IN 5 PERCENT DEXTROSE 900 MG: 18 INJECTION, SOLUTION INTRAVENOUS at 08:10

## 2018-10-23 RX ADMIN — DEXAMETHASONE SODIUM PHOSPHATE 8 MG: 4 INJECTION, SOLUTION INTRAMUSCULAR; INTRAVENOUS at 09:10

## 2018-10-23 RX ADMIN — DOCUSATE SODIUM 100 MG: 100 CAPSULE, LIQUID FILLED ORAL at 09:10

## 2018-10-23 RX ADMIN — CLINDAMYCIN IN 5 PERCENT DEXTROSE 900 MG: 18 INJECTION, SOLUTION INTRAVENOUS at 12:10

## 2018-10-23 RX ADMIN — LEVOTHYROXINE SODIUM 100 MCG: 0.1 TABLET ORAL at 05:10

## 2018-10-23 RX ADMIN — ROSUVASTATIN CALCIUM 5 MG: 5 TABLET, FILM COATED ORAL at 09:10

## 2018-10-23 RX ADMIN — ASPIRIN 325 MG ORAL TABLET 325 MG: 325 PILL ORAL at 09:10

## 2018-10-23 RX ADMIN — CLINDAMYCIN IN 5 PERCENT DEXTROSE 900 MG: 18 INJECTION, SOLUTION INTRAVENOUS at 01:10

## 2018-10-23 RX ADMIN — OXYCODONE HYDROCHLORIDE 10 MG: 5 TABLET ORAL at 01:10

## 2018-10-23 NOTE — PLAN OF CARE
Problem: Patient Care Overview  Goal: Plan of Care Review  Outcome: Ongoing (interventions implemented as appropriate)  Patient AOx4, remains free from fall or injury. IVF running as ordered, IV antibiotic administered as ordered. No c/o pain throughout shift. Bed in low locked position. Bed alarm armed and audible with call bell within reach. Will continue to monitor.

## 2018-10-23 NOTE — PLAN OF CARE
Problem: Physical Therapy Goal  Goal: Physical Therapy Goal  Goals to be met by: 10/30/2018     Patient will increase functional independence with mobility by performin. Supine to sit with Modified [place]  2. Sit to stand transfer with Modified [place]  3. Gait  x 150 feet with Modified [place] using Rolling Walker.   4. Lower extremity exercise program x10 reps per handout, with supervision     Outcome: Unable to achieve outcome(s) by discharge  Pt being D/c'd home with HHPT and family assist PRN.  RW and BSC recommended.

## 2018-10-23 NOTE — PLAN OF CARE
[facility]    HOME HEALTH ORDERS  FACE TO FACE ENCOUNTER    Patient Name: Ella Sarmiento  YOB: 1935    PCP: Aravind Bright MD   PCP Address: [address] [place], [place] [address] [address]  PCP Phone Number: [phone]  PCP Fax: [phone]    Encounter Date: 10/23/2018    Admit to Home Health    Diagnoses:  Active Hospital Problems    Diagnosis  POA    *Primary osteoarthritis of left knee [M17.12]  Yes    Malignant neoplasm of lateral wall of urinary bladder [C67.2]  Yes      Resolved Hospital Problems   No resolved problems to display.       Future Appointments   Date Time Provider Department Center   2/4/2019  2:20 PM Ifrah Falk MD [facility]     Follow-up Information     Franky Mendoza MD In 2 weeks.    Specialty:  Orthopedic Surgery  Contact information:  [address] [facility]  [address]  [place] [address]  [phone]                     I have seen and examined this patient face to face today. My clinical findings that support the need for the home health skilled services and home bound status are the following:  Weakness/numbness causing balance and gait disturbance due to Weakness/Debility and Surgery making it taxing to leave home.  Requiring assistive device to leave home due to unsteady gait caused by  Weakness/Debility and Surgery.  Medical restrictions requiring assistance of another human to leave home due to  Decreased range of motions in extremities, Post surgery monitoring and Weight bearing restricted.    Allergies:  Review of patient's allergies indicates:   Allergen Reactions    Codeine      Patient can't remember    Keflex [cephalexin]        Diet: regular diet    Activities: activity as tolerated: WBAT  Assessment:  A/P POD 1 s/p left  UKA               Nursing:   SN to complete comprehensive assessment including routine vital signs. Instruct on disease process and s/s of complications to report to MD. Review/verify  medication list sent home with the patient at time of discharge  and instruct patient/caregiver as needed. Frequency may be adjusted depending on start of care date.    Notify MD if SBP > 160 or < 90; DBP > 90 or < 50; HR > 120 or < 50; Temp > 101; Other:         CONSULTS:    Physical Therapy to evaluate and treat. Evaluate for home safety and equipment needs; Establish/upgrade home exercise program. Perform / instruct on therapeutic exercises, gait training, transfer training, and Range of Motion.  Occupational Therapy to evaluate and treat. Evaluate home environment for safety and equipment needs. Perform/Instruct on transfers, ADL training, ROM, and therapeutic exercises.  Aide to provide assistance with personal care, ADLs, and vital signs.    MISCELLANEOUS CARE:  N/A    WOUND CARE ORDERS  n/a      Medications: Review discharge medications with patient and family and provide education.        I certify that this patient is confined to her home and needs intermittent skilled nursing care, physical therapy and occupational therapy.    Franky Mendoza MD

## 2018-10-23 NOTE — UM SECONDARY REVIEW
Physician Advisor External    Level of Care Issue     Sent to EHR for unicompartmental knee arthroplasty     Approved Inpatient     EHR determination IP by Dr. Horne

## 2018-10-23 NOTE — PT/OT/SLP EVAL
Occupational Therapy   Evaluation and Discharge Note    Name: Ella Sarmiento  MRN: [id number]  Admitting Diagnosis:  Primary osteoarthritis of left knee 1 Day Post-Op    Recommendations:     Discharge Recommendations: home health OT(with family assist)  Discharge Equipment Recommendations:  none  Barriers to discharge:  None    History:     Occupational Profile:  Living Environment:   Previous level of function: The patient lives with her daughter in a SS house with 1 PUSHPA.  Roles and Routines: (I) with self care   Equipment Owned:  none(The patient has a RW and a barrowed BSC)  Assistance upon Discharge: daughter    Past Medical History:   Diagnosis Date    Arthritis     Thyroid disease     Vaginal delivery     x4       Past Surgical History:   Procedure Laterality Date    ARTHROPLASTY, KNEE, UNICOMPARTMENTAL Left 10/22/2018    Performed by Franky Mendoza MD at [facility] OR    bladder cancer  2016    COLONOSCOPY      cysto bladder biopsy  with fulgeration  4/7/2017    Performed by Ifrah Falk MD at [facility] OR    CYSTOSCOPY W/BLADDER BIOPSY,POSSIBLE FULGURATION-BLADDER, retrograde pyelogram Bilateral 4/13/2018    Performed by Ifrah Falk MD at [facility] OR    FULGURATION-BLADDER  4/7/2017    Performed by Ifrah Falk MD at [facility] OR    HYSTERECTOMY      JOINT REPLACEMENT      Rt total knee    KNEE SURGERY      Rt & Lt knees scoped    PYELOGRAM-RETROGRADE Bilateral 3/3/2017    Performed by [facility]rah Falk MD at [facility] OR    TUMOR-BLADDER-TRANSURETHRAL RESECTION, mitomycin instillation Bilateral 3/3/2017    Performed by Ifrah Falk MD at [facility] OR    UNICOMPARTMENTAL ARTHROPLASTY OF KNEE Left 10/22/2018    Procedure: ARTHROPLASTY, KNEE, UNICOMPARTMENTAL;  Surgeon: Franky Mendoza MD;  Location: [facility] OR;  Service: Orthopedics;  Laterality: Left;       Subjective     Chief Complaint: starting to have knee pain  Patient/Family stated goals: go home today  Communicated with:  nurse, Tena prior to session.  Pain/Comfort:  · Pain Rating 1: 7/10  · Location - Side 1: Left  · Location 1: knee  · Pain Addressed 1: Cessation of Activity, Nurse notified  · Pain Rating Post-Intervention 1: 7/10(pain meds were requested)    Patients cultural, spiritual, Scientology conflicts given the current situation: none    Objective:     Patient found with: cryotherapy, peripheral IV    General Precautions: Standard, fall   Orthopedic Precautions:LLE weight bearing as tolerated   Braces: N/A     Occupational Performance:    Bed Mobility:    · N/T The patient was found seated in a chair.    Functional Mobility/Transfers:  · Patient completed Sit <> Stand Transfer with stand by assistance  with  rolling walker   · Patient completed Toilet Transfer Step Transfer technique with stand by assistance with  rolling walker. The patient stood from the toilet ad liat without the presence of OT.  · Functional Mobility:  The patient amb using a RW with SBA and VC from the chair to the toilet and sink .    Activities of Daily Living:  · Grooming: supervision to stand at the sink to wash her hands  · Lower Body Dressing: dependence The patient was educated re: LB dressing s/p left UKA. The pastient was given written handout .  · Toileting: modified independence      Cognitive/Visual Perceptual:  Cognitive/Psychosocial Skills:     -       Oriented to: Person, Place and Situation   -       Follows Commands/attention:Follows two-step commands  -       Communication: clear/fluent  -       Memory: No Deficits noted  -       Safety awareness/insight to disability: impaired   -       Mood/Affect/Coping skills/emotional control: Appropriate to situation    Physical Exam:  Balance: -       fair+  Postural examination/scapula alignment:    -       No postural abnormalities identified  Skin integrity: Visible skin intact and left knee incision covered by dressing  Edema:  LLE  Upper Extremity Range of Motion:     -       Right Upper  "Extremity: WFL    -       Left Upper Extremity: WFL      Upper Extremity Strength:    -       Right Upper Extremity: WFL  -       Left Upper Extremity: WFL      Patient left up in chair, reclined with all lines intact, call button in reach and nurseTena present and daughter    [facility] 6 Click:  [facility] Total Score: 22    Treatment & Education:  OT eval is complete. The patient and daughter were educated re: OT role, Polar Ice use and LB dressing s/p UKA.       Education:    Assessment:     Ella Sarmiento is a 83 y.o. female with a medical diagnosis of Primary osteoarthritis of left knee. At this time, patient is functioning at their prior level of function and does not require further acute OT services. The patient wiill benefit from  OT to address home safety. The patient's daughter states she will be able to assist as needed at home.The patient has DME  As needed at home.     Clinical Decision Makin.  OT Low:  "Pt evaluation falls under low complexity for evaluation coding due to performance deficits noted in 1-3 areas as stated above and 0 co-morbities affecting current functional status. Data obtained from problem focused assessments. No modifications or assistance was required for completion of evaluation. Only brief occupational profile and history review completed."     Plan:     During this hospitalization, patient does not require further acute OT services.  Please re-consult if situation changes.    · Plan of Care Reviewed with: patient, daughter    This Plan of care has been discussed with the patient who was involved in its development and understands and is in agreement with the identified goals and treatment plan    GOALS:   Multidisciplinary Problems     Occupational Therapy Goals     Not on file          Multidisciplinary Problems (Resolved)        Problem: Occupational Therapy Goal    Goal Priority Disciplines Outcome Interventions   Occupational Therapy Goal   (Resolved)     OT, PT/OT " Outcome(s) achieved                    Time Tracking:     OT Date of Treatment: 10/23/18  OT Start Time: 1319  OT Stop Time: 1335  OT Total Time (min): 16 min    Billable Minutes:Evaluation 16    Kelsy Polanco OT  10/23/2018

## 2018-10-23 NOTE — PT/OT/SLP PROGRESS
Physical Therapy Treatment/Discharge summary  Patient Name:  Ella Sarmiento   MRN:  [id number]    Recommendations:     Discharge Recommendations:  home health PT   Discharge Equipment Recommendations: bedside commode, walker, rolling   Barriers to discharge: None    Assessment:     Ella Sarmiento is a 83 y.o. female admitted with a medical diagnosis of Primary osteoarthritis of left knee.  She presents with the following impairments/functional limitations:  impaired endurance, gait instability, impaired self care skills, decreased lower extremity function, pain, decreased ROM, edema, impaired skin, orthopedic precautions, decreased safety awareness, impaired balance, impaired functional mobilty, weakness pt has met goals adequately for D/C home with family assist PRN, HHPT, RW and BSC..    Rehab Prognosis:  Good; patient would benefit from acute skilled PT services to address these deficits and reach maximum level of function.      Recent Surgery: Procedure(s) (LRB):  ARTHROPLASTY, KNEE, UNICOMPARTMENTAL (Left) 1 Day Post-Op    Plan:     During this hospitalization, patient to be seen (N/A, pt being D/C'd home) to address the above listed problems via (N/A, pt being D/C'd home)  · Plan of Care Expires:  10/23/18   Plan of Care Reviewed with: patient, daughter    Subjective     Communicated with nsg prior to session.  Patient found seated in BS chair upon PT entry to room, agreeable to treatment.      Chief Complaint: pain  Patient comments/goals: to go home  Pain/Comfort:  · Pain Rating 1: 7/10  · Location - Orientation 1: (L knee)  · Location 1: (L knee)  · Pain Addressed 1: Reposition, Pre-medicate for activity, Distraction, Cessation of Activity, Nurse notified  · Pain Rating Post-Intervention 1: 7/10    Patients cultural, spiritual, Jain conflicts given the current situation: none    Objective:     Patient found with: cryotherapy     General Precautions: Standard, fall   Orthopedic Precautions:LLE weight  bearing as tolerated   Braces: N/A     Functional Mobility:  · Bed Mobility:     · Scooting: supervision  · Transfers:     · Sit to Stand:  contact guard assistance with RW with VC for hand placement/safety  · Commode:  SBA with VC for hand placement/safety  · Gait: 150' with Supervision with VC for walker management/safety, increased trunk ext, and sequencing  · Balance: FAir+ sit and stand      AM-PAC 6 CLICK MOBILITY  Turning over in bed (including adjusting bedclothes, sheets and blankets)?: 3  Sitting down on and standing up from a chair with arms (e.g., wheelchair, bedside commode, etc.): 1  Moving from lying on back to sitting on the side of the bed?: 3  Moving to and from a bed to a chair (including a wheelchair)?: 3  Need to walk in hospital room?: 3  Climbing 3-5 steps with a railing?: 3  Basic Mobility Total Score: 16       Therapeutic Activities and Exercises:   Dispensed and reviewed B LE there ex handout for HEP.  Pt verbalized/demonstrated understanding    Patient left up in chair with call button in reach, nsg notified and daughter present..    GOALS:   Multidisciplinary Problems     Physical Therapy Goals     Not on file          Multidisciplinary Problems (Resolved)        Problem: Physical Therapy Goal    Goal Priority Disciplines Outcome Goal Variances Interventions   Physical Therapy Goal   (Resolved)     PT, PT/OT Outcome(s) achieved     Description:  Goals to be met by: 10/30/2018     Patient will increase functional independence with mobility by performin. Supine to sit with Modified [place]  2. Sit to stand transfer with Modified [place]  3. Gait  x 150 feet with Modified [place] using Rolling Walker.   4. Lower extremity exercise program x10 reps per handout, with supervision                      Time Tracking:     PT Received On: 10/23/18  PT Start Time: 1445     PT Stop Time: 1508  PT Total Time (min): 23 min     Billable Minutes: Gait Training 15 and Therapeutic  Exercise 8    Treatment Type: Treatment  PT/PTA: PT     PTA Visit Number: 0     Ana Cristina Jordan, PT  10/23/2018

## 2018-10-23 NOTE — OP NOTE
DATE OF PROCEDURE:  10/22/2018    SURGEON:  Franky Mendoza M.D.    ASSISTANT:  None.    PREOPERATIVE DIAGNOSIS:  Left knee osteoarthritis.      POSTOPERATIVE DIAGNOSIS:  Left knee osteoarthritis.    PROCEDURE PERFORMED:  Left uni-knee arthroplasty.    COMPLICATIONS:  None.    IMPLANTS:  [place] [employer] Partial knee replacement, twin-peg femoral size small,   left medial tibial tray size C, anatomic meniscal bearing size 3 mm small.    COMPLICATIONS:  None.    BLOOD LOSS: 25 mL.    HISTORY OF PRESENT ILLNESS:  The patient is an 83-year-old female with   consistent left knee pain with osteoarthritis isolated to the medial joint   space. She failed conservative treatment.  At this time wished to proceed with   surgical procedure.    PROCEDURE IN DETAIL:  After appropriate consents were signed, which included   discussion of possible risks and complications, which included but not limited   to wound healing complications as well as skin breakdown and infection as well   as injury to nerves, vessels, soft tissue in the area as well as DVT, pulmonary   embolism, myocardial infarction, stroke and death.  The patient understood these   risks and complications and wished to proceed with the procedure.  After   consent was signed, the patient was brought to the Operating Room, kept in the   supine position, transferred to the operating room table, all bony prominences   and neurovascular structures were well padded.  IV antibiotics have been   infused.  A timeout was called, which included the scrub tech, the anesthesia   staff, circulating nurse as well as surgical staff.  The incision was made over   the anterior aspect of the left knee, the medial joint space, we dissected down   performing our medial parapatellar arthrotomy.  We performed our tibial cut with   the sagittal saw as well as the oscillating saw.  We were happy with this   tibial cut.  We then entered intramedullary canal, connected the piece component   to  the medial femoral condyle, drilled 2 peg holes, so that they were in the   center portion of the medial femoral condyle.  We then placed the 0 spigot and   drilled as well as cut our posterior femoral cut.  We sized this, it showed to   be a zero in extension, therefore, we placed our 3 spigot and reamed the distal   femur.  The 3 was excellent in flexion and extension. We trialed this component,   we were very happy with this, we then removed all the components, removed the   osteophytes anteriorly as well as medial and lateral.  We then cemented the   femoral component into place and held the space until the cement dried.  We then   placed the 3 again, we were happy with this in flexion and extension.  We   placed the final 3 mobile polyethylene unit.  We irrigated well.  The tourniquet   was let down, there was no evidence of any arterial bleeding.  The venous   bleeders were coagulated.   The arthrotomy was closed with #1 Vicryl sutures and   figure of eight.  The subcutaneous tissue was closed with 2-0 Vicryl sutures,   inverted 2-0 and staples were used for the skin. Sterile dressing applied to the   wound and the patient was awoken from anesthesia without complication.  All the   counts were correct.    PREOPERATIVE CARE FOR THE PATIENT:  She is weightbearing as tolerated to the   left lower extremity.      ND/HN  dd: 10/22/2018 14:44:36 (CDT)  td: 10/22/2018 20:55:31 (CDSUKUMAR)  Doc ID   #[id number]  Job ID #[id number]    CC:

## 2018-10-23 NOTE — PT/OT/SLP EVAL
Physical Therapy Evaluation    Patient Name:  Ella Sarmiento   MRN:  [id number]    Recommendations:     Discharge Recommendations:  home health PT   Discharge Equipment Recommendations: bedside commode, walker, rolling   Barriers to discharge: None    Assessment:     Ella Sarmiento is a 83 y.o. female admitted with a medical diagnosis of Primary osteoarthritis of left knee.  She presents with the following impairments/functional limitations:  impaired functional mobilty, impaired balance, decreased safety awareness, impaired coordination, orthopedic precautions, impaired endurance, impaired self care skills, gait instability, decreased lower extremity function, pain, decreased ROM, edema, impaired skin, impaired fine motor .    Rehab Prognosis:  Good; patient would benefit from acute skilled PT services to address these deficits and reach maximum level of function.      Recent Surgery: Procedure(s) (LRB):  ARTHROPLASTY, KNEE, UNICOMPARTMENTAL (Left) 1 Day Post-Op    Plan:     During this hospitalization, patient to be seen BID(daily sat and sun) to address the above listed problems via gait training, therapeutic activities, therapeutic exercises  · Plan of Care Expires:  10/30/18   Plan of Care Reviewed with: patient, daughter    Subjective     Communicated with nsg prior to session.  Patient found HOB elevated upon PT entry to room, agreeable to evaluation.      Chief Complaint: pain  Patient comments/goals: to go home  Pain/Comfort:  · Pain Rating 1: 5/10  · Location 1: (L knee)  · Pain Addressed 1: Pre-medicate for activity, Reposition, Distraction, Cessation of Activity, Nurse notified  · Pain Rating Post-Intervention 1: 5/10    Patients cultural, spiritual, Cheondoism conflicts given the current situation: none    Living Environment:  Pt lives with daughter in a [facility] with TE  Prior to admission, patients level of function was Mod Indep.  Patient has the following equipment: none.  DME owned (not currently  used): none.  Upon discharge, patient will have assistance from Daughter.    Objective:     Patient found with: cryotherapy, bed alarm, SCD     General Precautions: Standard, fall   Orthopedic Precautions:LLE weight bearing as tolerated   Braces: N/A     Exams:  · Cognitive Exam:  Patient is oriented to Person, Place, Time and Situation  · Gross Motor Coordination:  mildly impaired 2/2 gen weakness  · Postural Exam:  Patient presented with the following abnormalities:    · -       Rounded shoulders  · -       Forward head  · Sensation:    · -       Intact  light/touch B LE's  · Skin Integrity/Edema:      · -       Skin integrity: L knee with sx dressing intact  · RLE ROM: WFL  · RLE Strength: WFL  · LLE ROM: approx 10-90*  · LLE Strength: knee ext 3-/5, dflx 5/5    Functional Mobility:  · Bed Mobility:     · Scooting: stand by assistance  · Supine to Sit: stand by assistance  · Transfers:     · Sit to Stand:  contact guard assistance with RW with VC for hand placement/safety  · Toilet Transfer: minimum assistance with  RW and VC for hand placement/safety  using  Step Transfer  · Gait: 80' with RW and CGA with VC for walker management/safety, sequencing, and increased trunk ext  · Balance: Fair+ sit, Fair stand    AM-PAC 6 CLICK MOBILITY  Total Score:18       Therapeutic Activities and Exercises:  Pt performed toileting with set-up assist.  Pt stood at sink with RW and SBA for hand hygiene.  Instructed pt to perform B AP's, GS, QS while up in chair.  Pt verbalized/demonstrated undertanding    Patient left up in chair with all lines intact, call button in reach, nsg notified and daughter present.    GOALS:   Multidisciplinary Problems     Physical Therapy Goals        Problem: Physical Therapy Goal    Goal Priority Disciplines Outcome Goal Variances Interventions   Physical Therapy Goal     PT, PT/OT Ongoing (interventions implemented as appropriate)     Description:  Goals to be met by: 10/30/2018     Patient will  increase functional independence with mobility by performin. Supine to sit with Modified [place]  2. Sit to stand transfer with Modified [place]  3. Gait  x 150 feet with Modified [place] using Rolling Walker.   4. Lower extremity exercise program x10 reps per handout, with supervision                      History:     Past Medical History:   Diagnosis Date    Arthritis     Thyroid disease     Vaginal delivery     x4       Past Surgical History:   Procedure Laterality Date    ARTHROPLASTY, KNEE, UNICOMPARTMENTAL Left 10/22/2018    Performed by Franky [facility]za [facility] at [facility] OR    bladder cancer  2016    COLONOSCOPY      cysto bladder biopsy  with fulgeration  2017    Performed by Ifrah Falk MD at [facility]    CYSTOSCOPY W/BLADDER BIOPSY,POSSIBLE FULGURATION-BLADDER, retrograde pyelogram Bilateral 2018    Performed by [facility] at [facility]    FULGURATION-BLADDER  2017    [facility] by [facility] at [facility]    HYSTERECTOMY      JOINT REPLACEMENT      Rt total knee    KNEE SURGERY      Rt & Lt knees scoped    PYELOGRAM-RETROGRADE Bilateral 3/3/2017    [facility] at [facility]    TUMOR-BLADDER-TRANSURETHRAL RESECTION, mitomycin instillation Bilateral 3/3/2017    Performed by [facility] at [facility]    UNICOMPARTMENTAL ARTHROPLASTY OF KNEE Left 10/22/2018    Procedure: ARTHROPLASTY, KNEE, UNICOMPARTMENTAL;  Surgeon: Franky Mendoza MD;  Location: [facility] OR;  Service: Orthopedics;  Laterality: Left;       Clinical Decision Making:     History  Co-morbidities and personal factors that may impact the plan of care Examination  Body Structures and Functions, activity limitations and participation restrictions that may impact the plan of care Clinical Presentation   Decision Making/ Complexity Score   Co-morbidities:   [] Time since onset of injury / illness / exacerbation  [] Status of current  condition  []Patient's cognitive status and safety concerns    [] Multiple Medical Problems (see med hx)  Personal Factors:   [] Patient's age  [] Prior Level of function   [] Patient's home situation (environment and family support)  [] Patient's level of motivation  [] Expected progression of patient      HISTORY:(criteria)    [] 55320 - no personal factors/history    [] 61745 - has 1-2 personal factor/comorbidity     [] 61103 - has >3 personal factor/comorbidity     Body Regions:  [] Objective examination findings  [] Head     []  Neck  [] Trunk   [] Upper Extremity  [] Lower Extremity    Body Systems:  [] For communication ability, affect, cognition, language, and learning style: the assessment of the ability to make needs known, consciousness, orientation (person, place, and time), expected emotional /behavioral responses, and learning preferences (eg, learning barriers, education  needs)  [] For the neuromuscular system: a general assessment of gross coordinated movement (eg, balance, gait, locomotion, transfers, and transitions) and motor function  (motor control and motor learning)  [] For the musculoskeletal system: the assessment of gross symmetry, gross range of motion, gross strength, height, and weight  [] For the integumentary system: the assessment of pliability(texture), presence of scar formation, skin color, and skin integrity  [] For cardiovascular/pulmonary system: the assessment of heart rate, respiratory rate, blood pressure, and edema     Activity limitations:    [] Patient's cognitive status and saf ety concerns          [] Status of current condition      [] Weight bearing restriction  [] Cardiopulmunary Restriction    Participation Restrictions:   [] Goals and goal agreement with the patient     [] Rehab potential (prognosis) and probable outcome      Examination of Body System: (criteria)    [] 71925 - addressing 1-2 elements    [] 20479 - addressing a total of 3 or more elements     []  25774 -  Addressing a total of 4 or more elements         Clinical Presentation: (criteria)  Choose one     On examination of body system using standardized tests and measures patient presents with (CHOOSE ONE) elements from any of the following: body structures and functions, activity limitations, and/or participation restrictions.  Leading to a clinical presentation that is considered (CHOOSE ONE)                              Clinical Decision Making  (Eval Complexity):  Choose One     Time Tracking:     PT Received On: 10/23/18  PT Start Time: 1012     PT Stop Time: 1038  PT Total Time (min): 26 min     Billable Minutes: Evaluation 15 and Therapeutic Activity 15      Ana Cristina Jordan, PT  10/23/2018

## 2018-10-23 NOTE — PLAN OF CARE
Problem: Occupational Therapy Goal  Goal: Occupational Therapy Goal  Outcome: Outcome(s) achieved Date Met: 10/23/18  OT eval is complete. The patient and daughter were educated re: OT role, Polar Ice use and LB dressing s/p UKA.      Comments: The patient's daughter states she will be able to assist as needed at home.The patient has DME  As needed at home.  The patient wiill benefit from  OT to address home safety.

## 2018-10-23 NOTE — DISCHARGE SUMMARY
Discharge Summary           Orthopedic Surgery      Admit Date: 10/22/2018    Discharge Date and Time: 10/23/2018    Discharge Attending Physician: Franky Mendoza MD    Surgery Date: 10/22/18    Procedure performed: Procedure(s) (LRB):  ARTHROPLASTY, KNEE, UNICOMPARTMENTAL (Left)    Diagnoses:  Active Hospital Problems    Diagnosis  POA    *Primary osteoarthritis of left knee [M17.12]  Yes    Malignant neoplasm of lateral wall of urinary bladder [C67.2]  Yes      Resolved Hospital Problems   No resolved problems to display.       Discharged Condition: good    HPI:      Pt is a 83 y.o.female suffering from the above diagnosis of the Left lower extremity. The patient has undergone all non-surgical intervention and at this time she wishes to proceed with a surgical procedure.     Hospital Course:    Pt admitted on 10/22/2018 and taken to the operating room for the above diagnosis. Please refer to the operative noted for details of the operation. Immediately post-operatively, the patient was transferred to the recover room and then to the floor without complications. The patient has progressed well with physical therapy. she is ambulating in the halls with an assistive device. her pain is well controled on PO pain meds alone. she is tolerating a regular diet. she will be d/c'ed home today in stable condition.    Follow up:  Patient will F/U in clinic in 2 weeks    Weight bearing restrictions:  Patient will remain as tolerated    Diet:   general        Consults: PTOT    Significant Diagnostic Studies: surgery    Special Treatments/Procedures: none and surgery: UKA    Disposition: Home-Health Care [facility]    Patient Instructions:        Medication List      START taking these medications    aspirin 325 MG tablet  Take 1 tablet (325 mg total) by mouth once daily.  Replaces:  aspirin 81 MG EC tablet     docusate sodium 100 MG capsule  Commonly known as:  COLACE  Take 1 capsule (100 mg total) by mouth 2 (two)  times daily as needed for Constipation.     esomeprazole 40 MG capsule  Commonly known as:  NEXIUM  Take 1 capsule (40 mg total) by mouth before breakfast.     oxyCODONE-acetaminophen 5-325 mg per tablet  Commonly known as:  PERCOCET  Take 1-2 tablets by mouth every 4 (four) hours as needed for Pain.        CONTINUE taking these medications    acetaminophen 650 MG Tbsr  Commonly known as:  TYLENOL     B-12 DOTS 500 MCG tablet  Generic drug:  cyanocobalamin     BENADRYL ALLERGY ORAL     biotin 1 mg tablet     calcium carbonate 600 mg calcium (1,500 mg) Tab  Commonly known as:  OS-CALE     CENTRUM SILVER ULTRA WOMEN'S Tab  Generic drug:  multivit-mineral-iron-lutein     cetirizine 10 MG tablet  Commonly known as:  ZYRTEC     clotrimazole-betamethasone 1-0.05% cream  Commonly known as:  LOTRISONE     dicyclomine 10 MG capsule  Commonly known as:  BENTYL     FIBER CHOICE ORAL     FINACEA 15 % gel  Generic drug:  azelaic acid     fluticasone 50 mcg/actuation nasal spray  Commonly known as:  FLONASE     levothyroxine 100 MCG tablet  Commonly known as:  SYNTHROID     melatonin 5 mg Tab     naproxen sodium 220 MG tablet  Commonly known as:  ANAPROX     phenazopyridine 100 MG tablet  Commonly known as:  PYRIDIUM  Take 2 tablets (200 mg total) by mouth 3 (three) times daily with meals.     Osmetech ORAL     rosuvastatin 5 MG tablet  Commonly known as:  CRESTOR     VITAMIN D ORAL     vitamin E 600 UNIT capsule        STOP taking these medications    aspirin 81 MG EC tablet  Commonly known as:  ECOTRIN  Replaced by:  aspirin 325 MG tablet           Where to Get Your Medications      You can get these medications from any pharmacy    Bring a paper prescription for each of these medications  · aspirin 325 MG tablet  · docusate sodium 100 MG capsule  · esomeprazole 40 MG capsule  · oxyCODONE-acetaminophen 5-325 mg per tablet

## 2018-10-23 NOTE — PLAN OF CARE
Problem: Physical Therapy Goal  Goal: Physical Therapy Goal  Goals to be met by: 10/30/2018     Patient will increase functional independence with mobility by performin. Supine to sit with Modified [place]  2. Sit to stand transfer with Modified [place]  3. Gait  x 150 feet with Modified [place] using Rolling Walker.   4. Lower extremity exercise program x10 reps per handout, with supervision    Outcome: Ongoing (interventions implemented as appropriate)  Initial PT evaluation performed.  Pt could benefit from skilled PT services Bid, Qd sat and sun, in order to maximize function prior to D/C.

## 2018-10-24 NOTE — PLAN OF CARE
10/23/18 1415   Final Note   Assessment Type Final Discharge Note   Anticipated Discharge Disposition Home-Health   What phone number can be called within the next 1-3 days to see how you are doing after discharge? (see chart)   Hospital Follow Up  Appt(s) scheduled? Yes   Discharge plans and expectations educations in teach back method with documentation complete? Yes   Right Care Referral Info   Post Acute Recommendation Home-care   Referral Type home care   Facility Name [address] [address]

## 2018-10-24 NOTE — PLAN OF CARE
"TN completed discharge needs assessment. TN provided and reviewed with patient "Blue My Health Packet" , "Help At Home" and "Discharge Planning Begins on Admission" handouts. TN discussed with patient the things the patient is responsible for to manage patient's  healthcare at home. Patient verbalized understanding & teachback implemented. Patient prefers morning doctor appointments.     10/23/18 1009   Discharge Assessment   Assessment Type Discharge Planning Assessment   Confirmed/corrected address and phone number on facesheet? Yes   Assessment information obtained from? Patient;Caregiver   Communicated expected length of stay with patient/caregiver no   Prior to hospitilization cognitive status: Alert/Oriented   Prior to hospitalization functional status: Independent;Assistive Equipment   Current cognitive status: Alert/Oriented   Current Functional Status: Assistive Equipment;Needs Assistance   Able to Return to Prior Arrangements yes   Is patient able to care for self after discharge? Unable to determine at this time (comments)   Who are your caregiver(s) and their phone number(s)? (daughter, Oanh Sarmiento)   Patient's perception of discharge disposition admitted as an inpatient   Patient currently being followed by outpatient case management? No   Patient currently receives any other outside agency services? No   Equipment Currently Used at Home bedside commode;cane, straight;walker, rolling   Do you have any problems affording any of your prescribed medications? No   Is the patient taking medications as prescribed? yes   Does the patient have transportation home? Yes   Transportation Available family or friend will provide;car   Does the patient receive services at the [facility]madin Clinic? No   Discharge Plan A Home with family;Home Health   Discharge Plan B Home with family;Home Health   Patient/Family In Agreement With Plan yes     [id number] [place] - [address] [place] [address]  [address] [place] [address] " [place] [id number]  Phone: [phone] Fax: [phone]

## 2018-10-26 NOTE — ANESTHESIA POSTPROCEDURE EVALUATION
"Anesthesia Post Evaluation    Patient: Ella Sarmiento    Procedure(s) Performed: Procedure(s) (LRB):  ARTHROPLASTY, KNEE, UNICOMPARTMENTAL (Left)    Final Anesthesia Type: spinal (w/MAC)  Patient location during evaluation: PACU  Patient participation: Yes- Able to Participate  Level of consciousness: awake and alert, awake and oriented  Post-procedure vital signs: reviewed and stable  Pain management: adequate  Airway patency: patent  PONV status at discharge: No PONV  Anesthetic complications: no      Cardiovascular status: blood pressure returned to baseline, hemodynamically stable and stable  Respiratory status: unassisted  Hydration status: euvolemic  Follow-up not needed (Regaining function post spinal ).        Visit Vitals  /73 (BP Location: Left arm, Patient Position: Lying)   Pulse 69   Temp 36.5 °C (97.7 °F) (Oral)   Resp 18   Ht 5' 2" (1.575 m)   Wt 82.1 kg (180 lb 14.4 oz)   SpO2 95%   Breastfeeding? No   BMI 33.09 kg/m²       Pain/Gonzalo Score: No Data Recorded      "

## 2019-01-25 ENCOUNTER — HOSPITAL ENCOUNTER (OUTPATIENT)
Dept: RADIOLOGY | Facility: HOSPITAL | Age: 84
Discharge: HOME | End: 2019-01-25
Attending: ORTHOPAEDIC SURGERY
Payer: MEDICARE

## 2019-01-25 DIAGNOSIS — C67.9 MALIGNANT NEOPLASM OF URINARY BLADDER, UNSPECIFIED SITE: Primary | ICD-10-CM

## 2019-01-25 DIAGNOSIS — M79.605 LEFT LEG PAIN: ICD-10-CM

## 2019-01-25 PROCEDURE — 93971 EXTREMITY STUDY: CPT | Mod: TC,LT

## 2019-01-25 PROCEDURE — 93971 US LOWER EXTREMITY VEINS LEFT: ICD-10-PCS | Mod: 26,LT,, | Performed by: RADIOLOGY

## 2019-01-25 PROCEDURE — 93971 EXTREMITY STUDY: CPT | Mod: 26,LT,, | Performed by: RADIOLOGY

## 2019-02-04 ENCOUNTER — PROCEDURE VISIT (OUTPATIENT)
Dept: UROLOGY | Facility: CLINIC | Age: 84
End: 2019-02-04
Payer: MEDICARE

## 2019-02-04 VITALS — RESPIRATION RATE: 16 BRPM | WEIGHT: 167 LBS | BODY MASS INDEX: 28.51 KG/M2 | HEIGHT: 64 IN

## 2019-02-04 DIAGNOSIS — C67.2 MALIGNANT NEOPLASM OF LATERAL WALL OF URINARY BLADDER: Primary | ICD-10-CM

## 2019-02-04 PROCEDURE — 52000 CYSTOSCOPY: ICD-10-PCS | Mod: S$PBB,,, | Performed by: UROLOGY

## 2019-02-04 PROCEDURE — 52000 CYSTOURETHROSCOPY: CPT | Mod: PBBFAC | Performed by: UROLOGY

## 2019-02-04 NOTE — PROCEDURES
"Cystoscopy  Date/Time: 2/4/2019 3:57 PM  Performed by: Ifrah Falk MD  Authorized by: Ifrah Falk MD     Consent Done?:  Yes (Written)  Time out: Immediately prior to procedure a "time out" was called to verify the correct patient, procedure, equipment, support staff and site/side marked as required.    Indications: history bladder cancer    Position:  Dorsal lithotomy  Anesthesia:  Lidocaine jelly  Patient sedated?: No    Preparation: Patient was prepped and draped in usual sterile fashion      Scope type:  Flexible cystoscope  Stent inserted: No    Stent removed: No    External exam normal: Yes    Digital exam performed: No    Urethra normal: Yes  Bladder neck normal: Bladder neck normal   Bladder normal: Yes (Few mild areas of erythema in posterior bladder at prior resection site, no tumor.)      Patient tolerance:  Patient tolerated the procedure well with no immediate complications     BCG x 3 in March  Cysto in 6 mths.       "

## 2019-02-07 ENCOUNTER — OFFICE VISIT (OUTPATIENT)
Dept: OBSTETRICS AND GYNECOLOGY | Facility: CLINIC | Age: 84
End: 2019-02-07
Payer: MEDICARE

## 2019-02-07 VITALS
DIASTOLIC BLOOD PRESSURE: 73 MMHG | WEIGHT: 163.81 LBS | BODY MASS INDEX: 27.96 KG/M2 | RESPIRATION RATE: 15 BRPM | SYSTOLIC BLOOD PRESSURE: 128 MMHG | HEART RATE: 104 BPM | HEIGHT: 64 IN

## 2019-02-07 DIAGNOSIS — Z00.00 ANNUAL PHYSICAL EXAM: Primary | ICD-10-CM

## 2019-02-07 DIAGNOSIS — Z90.710 STATUS POST HYSTERECTOMY: ICD-10-CM

## 2019-02-07 DIAGNOSIS — R92.30 DENSE BREAST TISSUE ON MAMMOGRAM: ICD-10-CM

## 2019-02-07 DIAGNOSIS — Z01.419 ENCOUNTER FOR GYNECOLOGICAL EXAMINATION WITHOUT ABNORMAL FINDING: ICD-10-CM

## 2019-02-07 DIAGNOSIS — N95.1 MENOPAUSAL STATE: ICD-10-CM

## 2019-02-07 DIAGNOSIS — C67.9 BLADDER CARCINOMA: ICD-10-CM

## 2019-02-07 DIAGNOSIS — N95.2 ATROPHIC VAGINITIS: ICD-10-CM

## 2019-02-07 PROCEDURE — 99999 PR PBB SHADOW E&M-EST. PATIENT-LVL V: CPT | Mod: PBBFAC,,, | Performed by: OBSTETRICS & GYNECOLOGY

## 2019-02-07 PROCEDURE — 99999 PR PBB SHADOW E&M-EST. PATIENT-LVL V: ICD-10-PCS | Mod: PBBFAC,,, | Performed by: OBSTETRICS & GYNECOLOGY

## 2019-02-07 PROCEDURE — G0101 PR CA SCREEN;PELVIC/BREAST EXAM: ICD-10-PCS | Mod: S$PBB,,, | Performed by: OBSTETRICS & GYNECOLOGY

## 2019-02-07 PROCEDURE — G0101 CA SCREEN;PELVIC/BREAST EXAM: HCPCS | Mod: S$PBB,,, | Performed by: OBSTETRICS & GYNECOLOGY

## 2019-02-07 PROCEDURE — 99215 OFFICE O/P EST HI 40 MIN: CPT | Mod: PBBFAC | Performed by: OBSTETRICS & GYNECOLOGY

## 2019-02-07 NOTE — PROGRESS NOTES
Subjective:      Chief Complaint:    Chief Complaint   Patient presents with    Well Woman       Menstrual History:    OB History      Para Term  AB Living    4 4 4     3    SAB TAB Ectopic Multiple Live Births                       Menarche age: 14     No LMP recorded. Patient has had a hysterectomy.            Objective:      HISTORY OF PRESENT ILLNESS:  The patient is 83 years of age.  Here for annual   exam.  She is a  4, para 3, posthysterectomy menopausal.  The patient's   past history was reviewed.  Pertinent history, cancer of the bladder,   hysterectomy, BSO.  The patient is not on any estrogen replacement therapy.    Recently, the patient had cystoscopy with Dr. Falk for followup for the   bladder cancer.  The patient has no problem or complaint related to the genital   area, also recently had a left knee replacement.    PHYSICAL EXAMINATION:  VITAL SIGNS:  Blood pressure 128/73, weight 163.  BREASTS:  No lumps, mass, discharge, skin changes, retraction, nipple changes.    Axilla negative.  CHEST:  Dense breast noted.  PELVIC:  External normal.  Vulva normal.  Bartholin, urethral and McKittrick   negative.  Vagina is thin, atrophic, and dry.  Cervix, uterus and adnexa are   absent.  Good apical support.  No pain.  No descensus.  RECTAL:  Negative.    IMPRESSION:  Postmenopausal bladder cancer.    PLAN:    We will see the patient back within a year.  Plan mammogram.  Continue   with calcium, vitamin D and multivitamins.      JESSCIA  dd: 2019 13:57:42 (CST)  td: 2019 08:49:00 (CST)  Doc ID   #[id number]  Job ID #[id number]    CC:       History of Present Illness AND  Examination detailed DICTATE:             Assessment:      Diagnosis:MENOPAUSAL    GYN  EXAM   BLADDER  CA       Plan:      Return in 12  months

## 2019-02-11 ENCOUNTER — HOSPITAL ENCOUNTER (OUTPATIENT)
Dept: RADIOLOGY | Facility: HOSPITAL | Age: 84
Discharge: HOME | End: 2019-02-11
Attending: OBSTETRICS & GYNECOLOGY
Payer: MEDICARE

## 2019-02-11 VITALS — WEIGHT: 163 LBS | BODY MASS INDEX: 27.83 KG/M2 | HEIGHT: 64 IN

## 2019-02-11 DIAGNOSIS — R92.30 DENSE BREAST TISSUE ON MAMMOGRAM: ICD-10-CM

## 2019-02-11 DIAGNOSIS — Z00.00 ANNUAL PHYSICAL EXAM: ICD-10-CM

## 2019-02-11 DIAGNOSIS — N95.1 MENOPAUSAL STATE: ICD-10-CM

## 2019-02-11 DIAGNOSIS — Z12.39 SCREENING BREAST EXAMINATION: ICD-10-CM

## 2019-02-11 PROCEDURE — 77067 SCR MAMMO BI INCL CAD: CPT | Mod: 26,,, | Performed by: RADIOLOGY

## 2019-02-11 PROCEDURE — 77067 SCR MAMMO BI INCL CAD: CPT | Mod: TC

## 2019-02-11 PROCEDURE — 77063 MAMMO DIGITAL SCREENING BILAT WITH TOMOSYNTHESIS_CAD: ICD-10-PCS | Mod: 26,,, | Performed by: RADIOLOGY

## 2019-02-11 PROCEDURE — 77063 BREAST TOMOSYNTHESIS BI: CPT | Mod: 26,,, | Performed by: RADIOLOGY

## 2019-02-11 PROCEDURE — 77067 MAMMO DIGITAL SCREENING BILAT WITH TOMOSYNTHESIS_CAD: ICD-10-PCS | Mod: 26,,, | Performed by: RADIOLOGY

## 2019-03-08 ENCOUNTER — TELEPHONE (OUTPATIENT)
Dept: UROLOGY | Facility: CLINIC | Age: 84
End: 2019-03-08

## 2019-03-08 NOTE — TELEPHONE ENCOUNTER
----- Message from Raegan Morelos sent at 3/8/2019  2:44 PM CST -----  Contact: Self  Patient called to request antibiotics prior to upcoming nurse visits. Please call to advise at [phone]

## 2019-03-08 NOTE — TELEPHONE ENCOUNTER
Are we continuing BCG for this patient? She is due to start on the 13th- if so, did you discuss ordering ant prior to BCG? I don't remember doing this in the past. Please advise.

## 2019-03-11 ENCOUNTER — TELEPHONE (OUTPATIENT)
Dept: UROLOGY | Facility: CLINIC | Age: 84
End: 2019-03-11

## 2019-03-11 NOTE — TELEPHONE ENCOUNTER
Spoke to patient and advised of medication shortage and of updated treatment plan- advised that patient has been treated with BCG for over 1 year now and that she is low risk at this time. Provider feels like she is good to stop treatment but continue with monitoring with cysto. If anything new develops we will reassess. Patient verbalized understanding.

## 2019-08-26 ENCOUNTER — PROCEDURE VISIT (OUTPATIENT)
Dept: UROLOGY | Facility: CLINIC | Age: 84
End: 2019-08-26
Payer: MEDICARE

## 2019-08-26 VITALS — HEIGHT: 64 IN | BODY MASS INDEX: 29.37 KG/M2 | WEIGHT: 172 LBS | RESPIRATION RATE: 16 BRPM

## 2019-08-26 DIAGNOSIS — C67.2 MALIGNANT NEOPLASM OF LATERAL WALL OF URINARY BLADDER: ICD-10-CM

## 2019-08-26 PROCEDURE — 52000 CYSTOSCOPY: ICD-10-PCS | Mod: S$PBB,,, | Performed by: UROLOGY

## 2019-08-26 PROCEDURE — 52000 CYSTOURETHROSCOPY: CPT | Mod: PBBFAC | Performed by: UROLOGY

## 2019-08-26 NOTE — PROCEDURES
"Cystoscopy  Date/Time: 8/26/2019 11:10 AM  Performed by: Ifrah Falk MD  Authorized by: Ifrah Falk MD     Consent Done?:  Yes (Written)  Time out: Immediately prior to procedure a "time out" was called to verify the correct patient, procedure, equipment, support staff and site/side marked as required.    Indications: history bladder cancer    Position:  Dorsal lithotomy  Anesthesia:  Lidocaine jelly  Patient sedated?: No    Preparation: Patient was prepped and draped in usual sterile fashion      Scope type:  Flexible cystoscope  Stent inserted: No    Stent removed: No    External exam normal: Yes    Digital exam performed: No    Urethra normal: Yes  Bladder neck normal: Bladder neck normal   Bladder normal: Yes (Small area of erythema in R lateral bladder and some redness on prior resection site. No tumors. )      Patient tolerance:  Patient tolerated the procedure well with no immediate complications     Urine cytology today  Cystoscopy in 6 months.  CT urogram in 6 months.       "

## 2020-02-12 DIAGNOSIS — Z12.31 ENCOUNTER FOR SCREENING MAMMOGRAM FOR MALIGNANT NEOPLASM OF BREAST: Primary | ICD-10-CM

## 2020-02-14 ENCOUNTER — HOSPITAL ENCOUNTER (OUTPATIENT)
Dept: RADIOLOGY | Facility: HOSPITAL | Age: 85
Discharge: HOME | End: 2020-02-14
Attending: INTERNAL MEDICINE
Payer: MEDICARE

## 2020-02-14 VITALS — BODY MASS INDEX: 29.37 KG/M2 | HEIGHT: 64 IN | WEIGHT: 172 LBS

## 2020-02-14 DIAGNOSIS — Z12.31 ENCOUNTER FOR SCREENING MAMMOGRAM FOR MALIGNANT NEOPLASM OF BREAST: ICD-10-CM

## 2020-02-14 PROCEDURE — 77067 SCR MAMMO BI INCL CAD: CPT | Mod: TC

## 2020-02-14 PROCEDURE — 77067 MAMMO DIGITAL SCREENING BILAT WITH TOMOSYNTHESIS_CAD: ICD-10-PCS | Mod: 26,,, | Performed by: RADIOLOGY

## 2020-02-14 PROCEDURE — 77067 SCR MAMMO BI INCL CAD: CPT | Mod: 26,,, | Performed by: RADIOLOGY

## 2020-02-14 PROCEDURE — 77063 MAMMO DIGITAL SCREENING BILAT WITH TOMOSYNTHESIS_CAD: ICD-10-PCS | Mod: 26,,, | Performed by: RADIOLOGY

## 2020-02-14 PROCEDURE — 77063 BREAST TOMOSYNTHESIS BI: CPT | Mod: 26,,, | Performed by: RADIOLOGY

## 2020-02-19 ENCOUNTER — HOSPITAL ENCOUNTER (OUTPATIENT)
Dept: RADIOLOGY | Facility: HOSPITAL | Age: 85
Discharge: HOME | End: 2020-02-19
Attending: UROLOGY
Payer: MEDICARE

## 2020-02-19 DIAGNOSIS — C67.2 MALIGNANT NEOPLASM OF LATERAL WALL OF URINARY BLADDER: ICD-10-CM

## 2020-02-19 PROCEDURE — 74178 CT UROGRAM ABD PELVIS W WO: ICD-10-PCS | Mod: 26,,, | Performed by: RADIOLOGY

## 2020-02-19 PROCEDURE — 74178 CT ABD&PLV WO CNTR FLWD CNTR: CPT | Mod: 26,,, | Performed by: RADIOLOGY

## 2020-02-19 PROCEDURE — 25500020 PHARM REV CODE 255: Performed by: UROLOGY

## 2020-02-19 PROCEDURE — 74178 CT ABD&PLV WO CNTR FLWD CNTR: CPT | Mod: TC

## 2020-02-19 RX ADMIN — IOHEXOL 125 ML: 350 INJECTION, SOLUTION INTRAVENOUS at 03:02

## 2020-02-24 ENCOUNTER — PROCEDURE VISIT (OUTPATIENT)
Dept: UROLOGY | Facility: CLINIC | Age: 85
End: 2020-02-24
Payer: MEDICARE

## 2020-02-24 VITALS
DIASTOLIC BLOOD PRESSURE: 74 MMHG | BODY MASS INDEX: 28.57 KG/M2 | SYSTOLIC BLOOD PRESSURE: 108 MMHG | HEIGHT: 63 IN | WEIGHT: 161.25 LBS

## 2020-02-24 DIAGNOSIS — C67.2 MALIGNANT NEOPLASM OF LATERAL WALL OF URINARY BLADDER: Primary | ICD-10-CM

## 2020-02-24 PROCEDURE — 81002 URINALYSIS NONAUTO W/O SCOPE: CPT | Mod: PBBFAC | Performed by: UROLOGY

## 2020-02-24 PROCEDURE — 52000 CYSTOURETHROSCOPY: CPT | Mod: PBBFAC | Performed by: UROLOGY

## 2020-02-24 PROCEDURE — 52000 CYSTOSCOPY: ICD-10-PCS | Mod: S$PBB,,, | Performed by: UROLOGY

## 2020-02-24 NOTE — PROCEDURES
"Cystoscopy  Date/Time: 2/24/2020 10:40 AM  Performed by: Ifrah Falk MD  Authorized by: Ifrah Falk MD     Consent Done?:  Yes (Written)  Time out: Immediately prior to procedure a "time out" was called to verify the correct patient, procedure, equipment, support staff and site/side marked as required.    Indications: history bladder cancer    Position:  Dorsal lithotomy  Anesthesia:  Lidocaine jelly  Patient sedated?: No    Preparation: Patient was prepped and draped in usual sterile fashion      Scope type:  Flexible cystoscope  Stent inserted: No    Stent removed: No    External exam normal: Yes    Digital exam performed: No    Urethra normal: Yes  Bladder neck normal: Bladder neck normal   Bladder normal: No (Stable mild erythema on resection site in R posterior. Few small erythematous spots lateral to R UO (stable from 6mths ago).)      Patient tolerance:  Patient tolerated the procedure well with no immediate complications     CT uro - negative  Cysto - neg    Repeat cysto 6mths.       "

## 2020-08-31 ENCOUNTER — PROCEDURE VISIT (OUTPATIENT)
Dept: UROLOGY | Facility: CLINIC | Age: 85
End: 2020-08-31
Payer: MEDICARE

## 2020-08-31 DIAGNOSIS — C67.2 MALIGNANT NEOPLASM OF LATERAL WALL OF URINARY BLADDER: Primary | ICD-10-CM

## 2020-08-31 PROCEDURE — 88112 PR  CYTOPATH, CELL ENHANCE TECH: ICD-10-PCS | Mod: 26,,, | Performed by: PATHOLOGY

## 2020-08-31 PROCEDURE — 52000 CYSTOSCOPY: ICD-10-PCS | Mod: S$PBB,,, | Performed by: UROLOGY

## 2020-08-31 PROCEDURE — 52000 CYSTOURETHROSCOPY: CPT | Mod: PBBFAC | Performed by: UROLOGY

## 2020-08-31 PROCEDURE — 88112 CYTOPATH CELL ENHANCE TECH: CPT | Performed by: PATHOLOGY

## 2020-08-31 PROCEDURE — 88112 CYTOPATH CELL ENHANCE TECH: CPT | Mod: 26,,, | Performed by: PATHOLOGY

## 2020-08-31 NOTE — PROCEDURES
"Cystoscopy    Date/Time: 8/31/2020 10:20 AM  Performed by: Ifrah Falk MD  Authorized by: Ifrah Falk MD     Consent Done?:  Yes (Written)  Time out: Immediately prior to procedure a "time out" was called to verify the correct patient, procedure, equipment, support staff and site/side marked as required.    Indications: history bladder cancer    Position:  Dorsal lithotomy  Anesthesia:  Lidocaine jelly  Patient sedated?: No    Preparation: Patient was prepped and draped in usual sterile fashion      Scope type:  Flexible cystoscope  Stent inserted: No    Stent removed: No    External exam normal: Yes    Digital exam performed: No    Urethra normal: Yes  Bladder neck normal: Bladder neck normal   Bladder normal: No (Prior resection sites notes. Erythematous areas again noted in R posterior - stable.)      Patient tolerance:  Patient tolerated the procedure well with no immediate complications     Cytology today  Repeat cysto 6 months      "

## 2020-09-03 LAB — FINAL PATHOLOGIC DIAGNOSIS: NORMAL

## 2020-09-04 ENCOUNTER — TELEPHONE (OUTPATIENT)
Dept: UROLOGY | Facility: CLINIC | Age: 85
End: 2020-09-04

## 2021-03-11 ENCOUNTER — HOSPITAL ENCOUNTER (OUTPATIENT)
Dept: RADIOLOGY | Facility: HOSPITAL | Age: 86
Discharge: HOME | End: 2021-03-11
Attending: INTERNAL MEDICINE
Payer: MEDICARE

## 2021-03-11 VITALS — HEIGHT: 63 IN | WEIGHT: 161 LBS | BODY MASS INDEX: 28.53 KG/M2

## 2021-03-11 DIAGNOSIS — Z12.31 ENCOUNTER FOR SCREENING MAMMOGRAM FOR MALIGNANT NEOPLASM OF BREAST: ICD-10-CM

## 2021-03-11 PROCEDURE — 77067 SCR MAMMO BI INCL CAD: CPT | Mod: TC

## 2021-03-11 PROCEDURE — 77067 MAMMO DIGITAL SCREENING BILAT: ICD-10-PCS | Mod: 26,,, | Performed by: RADIOLOGY

## 2021-03-11 PROCEDURE — 77067 SCR MAMMO BI INCL CAD: CPT | Mod: 26,,, | Performed by: RADIOLOGY

## 2021-12-03 ENCOUNTER — HOSPITAL ENCOUNTER (OUTPATIENT)
Dept: RADIOLOGY | Facility: HOSPITAL | Age: 86
Discharge: HOME | End: 2021-12-03
Attending: INTERNAL MEDICINE
Payer: MEDICARE

## 2021-12-03 DIAGNOSIS — I83.893 VARICOSE VEINS OF LOWER EXTREMITIES WITH COMPLICATIONS, BILATERAL: ICD-10-CM

## 2021-12-03 PROCEDURE — 93970 EXTREMITY STUDY: CPT | Mod: TC

## 2021-12-03 PROCEDURE — 93970 EXTREMITY STUDY: CPT | Mod: 26,,, | Performed by: INTERNAL MEDICINE

## 2021-12-03 PROCEDURE — 93970 US LOWER EXTREMITY VEINS BILATERAL INSUFFICIENCY: ICD-10-PCS | Mod: 26,,, | Performed by: INTERNAL MEDICINE

## 2022-01-07 NOTE — PROGRESS NOTES
10:00AMFamily requested [facility].Mary Jane Manuel RN, BSN, [facility]  10/23/2018    1:21PM [facility] [place]..Mary Jane Manuel RN, BSN, [facility]  10/23/2018        
Daughter Oanh says pt has cane, bsc and cane already..Mary Jane Manuel, RN, BSN, [facility]  10/23/2018      
Ortho Daily Progress Note    Ella Sarmiento is a 83 y.o. female admitted on 10/22/2018      Chief Complaint/Reason for admission: No chief complaint on file.       Hospital Day: 1  Post Op Day: 1 Day Post-Op     The patient was seen and examined this morning at the bedside. Patient reports no acute issues overnight.  Patient reports that pain is adequately controlled.    _______________    Vitals:    10/23/18 0355 10/23/18 0750 10/23/18 0827 10/23/18 1145   BP: 122/60 126/60  107/73   Pulse: 61 65  69   Resp: 18 18  18   Temp: 97.8 °F (36.6 °C) 97.9 °F (36.6 °C)  97.7 °F (36.5 °C)   TempSrc: Axillary Oral  Oral   SpO2: 96% 97% 97% 95%   Weight:       Height:           Vital Signs (Most Recent)  Temp: 97.7 °F (36.5 °C) (10/23/18 1145)  Pulse: 69 (10/23/18 1145)  Resp: 18 (10/23/18 1145)  BP: 107/73 (10/23/18 1145)  SpO2: 95 % (10/23/18 1145)    Vital Signs Range (Last 24H):  Temp:  [97.5 °F (36.4 °C)-98.3 °F (36.8 °C)]   Pulse:  [61-72]   Resp:  [10-22]   BP: (107-166)/(59-90)   SpO2:  [94 %-100 %]       Physical:    AAOx3  Incision/ dressing clean/dry/intact  NVI Distally  Palpable distal pulses  CR<3sec      Recent Labs     10/22/18  1504 10/23/18  0520   K 4.4 4.2   CALCIUM 9.9 9.3   HGB 13.7 13.2   HCT 43.4 41.4       I/O last 3 completed shifts:  In: 1200 [I.V.:1200]  Out: 1301 [Urine:1301]          Assessment:  A/P POD 1 s/p left  UKA           Plan:    PT/OT: WBAT  Pain Control  DVT Prophylaxis: ASA    Discharge Plan: Home with HH today        Franky Mendoza MD  [facility]  
TN informed med surg nurse Tena that pt is cleared for [facility] from [facility]..Mary Jane Manuel RN, BSN, [facility]  10/23/2018    
TN sent refferal for home health though [facility]..Mary Jane Manuel RN, BSN, [facility]  10/23/2018      
WRITTEN DISCHARGE INFORMATION:     Follow-up Information     Franky Mendoza MD On 11/8/2018.    Specialty:  Orthopedic Surgery  Why:  11:15AM  Contact information:  [address]  [place] [address]  [phone]             Aravind Bright MD On 10/30/2018.    Specialty:  General Practice  Why:  out patient services: 10:30am, scheduled with Tootie  Contact information:  [address] [place] [address] [address]  [place] [address]  [phone]             [facility] On 10/24/2018.    Specialty:  Home Health Services  Why:  TN WILL CALL PATIENT TO CONFIRM IF ACCEPTED  Contact information:  [address]  [place] [address]  [phone]               Things that YOU are responsible for to Manage Your Care At Home:  1. Getting your prescriptions filled.  2. Taking you medications as directed. DO NOT MISS ANY DOSES!  3. Going to your follow-up doctor appointments. This is important because it allows the doctor to monitor your progress and to determine if any changes need to be made to your treatment plan.                         Help at Home  After discharge for assistance [facility] On [facility] 24/7 assistance  [phone]   Thank you for choosing [facility] for your care.  Please answer any calls you may receive from [facility] we want to continue to support you as you manage your healthcare needs.  Sincerely, Your [facility] Healthcare Manager is,  Mary Jane Manuel RN [facility] [phone]    
WRITTEN DISCHARGE INFORMATION:     Follow-up Information     Franky Mendoza MD On 11/8/2018.    Specialty:  Orthopedic Surgery  Why:  11:15AM  Contact information:  [address]  [place] [address]  [phone]             Aravind Bright MD On 10/30/2018.    Specialty:  General Practice  Why:  out patient services: 10:30am, scheduled with Tootie  Contact information:  [address] [place] [address] [address]  [place] [address]  [phone]             [facility] On 10/24/2018.    Specialties:  DME Provider, Home Health Services  Contact information:  [address]  [place] [address]  [phone]               Things that YOU are responsible for to Manage Your Care At Home:  1. Getting your prescriptions filled.  2. Taking you medications as directed. DO NOT MISS ANY DOSES!  3. Going to your follow-up doctor appointments. This is important because it allows the doctor to monitor your progress and to determine if any changes need to be made to your treatment plan.                                  Help at Home  After discharge for assistance [facility] On [facility] Nurse [facility] 24/7 assistance  [phone]     Thank you for choosing [facility] for your care.  Please answer any calls you may receive from [facility] we want to continue to support you as you manage your healthcare needs.  Sincerely, Your [facility] Healthcare Manager is,  Mary Jane Manuel RN [facility] [phone]    
No

## 2022-08-30 ENCOUNTER — OFFICE VISIT (OUTPATIENT)
Dept: UROLOGY | Facility: CLINIC | Age: 87
End: 2022-08-30
Payer: MEDICARE

## 2022-08-30 VITALS — SYSTOLIC BLOOD PRESSURE: 148 MMHG | DIASTOLIC BLOOD PRESSURE: 72 MMHG | HEART RATE: 91 BPM

## 2022-08-30 DIAGNOSIS — C67.9 BLADDER CARCINOMA: ICD-10-CM

## 2022-08-30 DIAGNOSIS — R35.1 NOCTURIA: ICD-10-CM

## 2022-08-30 DIAGNOSIS — R35.0 URINARY FREQUENCY: Primary | ICD-10-CM

## 2022-08-30 LAB
BILIRUB SERPL-MCNC: NEGATIVE MG/DL
BLOOD URINE, POC: NEGATIVE
CLARITY, POC UA: CLEAR
COLOR, POC UA: YELLOW
GLUCOSE UR QL STRIP: NORMAL
KETONES UR QL STRIP: NEGATIVE
LEUKOCYTE ESTERASE URINE, POC: ABNORMAL
NITRITE, POC UA: NEGATIVE
PH, POC UA: 8
POC RESIDUAL URINE VOLUME: 34 ML (ref 0–100)
PROTEIN, POC: NEGATIVE
SPECIFIC GRAVITY, POC UA: 1
UROBILINOGEN, POC UA: NORMAL

## 2022-08-30 PROCEDURE — 88112 CYTOPATH CELL ENHANCE TECH: CPT | Performed by: STUDENT IN AN ORGANIZED HEALTH CARE EDUCATION/TRAINING PROGRAM

## 2022-08-30 PROCEDURE — 99214 PR OFFICE/OUTPT VISIT, EST, LEVL IV, 30-39 MIN: ICD-10-PCS | Mod: S$GLB,,, | Performed by: NURSE PRACTITIONER

## 2022-08-30 PROCEDURE — 51798 POCT BLADDER SCAN: ICD-10-PCS | Mod: S$GLB,,, | Performed by: NURSE PRACTITIONER

## 2022-08-30 PROCEDURE — 51798 US URINE CAPACITY MEASURE: CPT | Mod: S$GLB,,, | Performed by: NURSE PRACTITIONER

## 2022-08-30 PROCEDURE — 87086 URINE CULTURE/COLONY COUNT: CPT | Performed by: NURSE PRACTITIONER

## 2022-08-30 PROCEDURE — 88112 PR  CYTOPATH, CELL ENHANCE TECH: ICD-10-PCS | Mod: 26,,, | Performed by: STUDENT IN AN ORGANIZED HEALTH CARE EDUCATION/TRAINING PROGRAM

## 2022-08-30 PROCEDURE — 99214 OFFICE O/P EST MOD 30 MIN: CPT | Mod: S$GLB,,, | Performed by: NURSE PRACTITIONER

## 2022-08-30 PROCEDURE — 88112 CYTOPATH CELL ENHANCE TECH: CPT | Mod: 26,,, | Performed by: STUDENT IN AN ORGANIZED HEALTH CARE EDUCATION/TRAINING PROGRAM

## 2022-08-30 PROCEDURE — 81002 POCT URINE DIPSTICK WITHOUT MICROSCOPE: ICD-10-PCS | Mod: S$GLB,,, | Performed by: NURSE PRACTITIONER

## 2022-08-30 PROCEDURE — 81002 URINALYSIS NONAUTO W/O SCOPE: CPT | Mod: S$GLB,,, | Performed by: NURSE PRACTITIONER

## 2022-08-30 NOTE — PROGRESS NOTES
"Subjective:      Ella Sarmiento is a 87 y.o. female who presents today regarding her urinary frequency. She is an established patient of Dr. Falk's and is new to me today.    The patient has a history of bladder cancer. She is s/p TURBT with mitomycin on 3/3/17. 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 ReTURBT on 4/7/17 was negative. She had BCG induction and maintenance with regular cystoscopy. Cysto/BBx in OR on 4/13/18 was negative (chronic inflammation).     It appears she was lost to follow up... Her last cysto/cytology was 8/31/20. Cytology- "Negative for malignant cells. Benign squamous and urothelial cells. Inflammation and bacteria present."      She presents today reporting urinary frequency- voiding every few hours. Nocturia about 3x/night- does not limit PM fluids. States that her urine "feels warm." Denies dysuria, urgency, urge incontinence. Denies hematuria, flank pain and fever/chills. Denies a history of recurrent UTIs.     The following portions of the patient's history were reviewed and updated as appropriate: allergies, current medications, past family history, past medical history, past social history, past surgical history and problem list.    Review of Systems  Constitutional: no fever or chills  ENT: no nasal congestion or sore throat  Respiratory: no cough or shortness of breath  Cardiovascular: no chest pain or palpitations  Gastrointestinal: no nausea or vomiting, tolerating diet  Genitourinary: as per HPI  Hematologic/Lymphatic: no easy bruising or lymphadenopathy  Musculoskeletal: no arthralgias or myalgias  Neurological: no seizures or tremors  Behavioral/Psych: no auditory or visual hallucinations     Objective:   Vital Signs:  Vitals:    08/30/22 0915   BP: (!) 148/72   Pulse: 91       Physical Exam   General: alert and oriented, no acute distress  Head: normocephalic, atraumatic  Neck: supple, normal ROM  Respiratory: Symmetric expansion, non-labored breathing  Cardiovascular: regular " rate and rhythm  Abdomen: soft, non tender, non distended, no palpable masses, no hernias, no hepatomegaly or splenomegaly  Pelvic: deferred  Skin: normal coloration and turgor, no rashes, no suspicious skin lesions noted  Neuro: alert and oriented x3, no gross deficits  Psych: normal judgment and insight, normal mood/affect, and non-anxious    Lab Review   Urinalysis demonstrates positive for leukocytes (trace)  Lab Results   Component Value Date    WBC 6.48 10/17/2018    HGB 13.2 10/23/2018    HCT 41.4 10/23/2018    MCV 96 10/17/2018     10/17/2018     Lab Results   Component Value Date    CREATININE 0.8 02/19/2020    BUN 13 10/23/2018       Imaging  None    Assessment:   Urinary frequency  Bladder carcinoma  Nocturia  Plan:   Ella was seen today for urinary frequency.    Diagnoses and all orders for this visit:    Urinary frequency  -     POCT URINE DIPSTICK WITHOUT MICROSCOPE  -     POCT Bladder Scan  -     Urine culture    Bladder carcinoma  -     Urine culture  -     Cystoscopy; Future  -     Cytology, Urine  -     Basic Metabolic Panel; Future  -     CT Urogram Abd Pelvis W WO; Future    Nocturia    Plan:  --Urine culture and cytology today, pt prefers to hold on antibiotics while we await culture results  --BMP and CT urogram  --Follow up for cysto with Dr. Falk

## 2022-08-30 NOTE — Clinical Note
Please schedule BMP, CT urogram and cysto with Dr. Falk after imaging (okay to overbook for cysto per Dr. Falk). Thanks!

## 2022-08-31 ENCOUNTER — TELEPHONE (OUTPATIENT)
Dept: GYNECOLOGIC ONCOLOGY | Facility: CLINIC | Age: 87
End: 2022-08-31
Payer: MEDICARE

## 2022-08-31 LAB — BACTERIA UR CULT: NORMAL

## 2022-09-02 ENCOUNTER — TELEPHONE (OUTPATIENT)
Dept: UROLOGY | Facility: CLINIC | Age: 87
End: 2022-09-02
Payer: MEDICARE

## 2022-09-07 ENCOUNTER — HOSPITAL ENCOUNTER (OUTPATIENT)
Dept: RADIOLOGY | Facility: HOSPITAL | Age: 87
Discharge: HOME | End: 2022-09-07
Attending: NURSE PRACTITIONER
Payer: MEDICARE

## 2022-09-07 DIAGNOSIS — C67.9 BLADDER CARCINOMA: ICD-10-CM

## 2022-09-07 PROCEDURE — 74178 CT ABD&PLV WO CNTR FLWD CNTR: CPT | Mod: 26,,, | Performed by: RADIOLOGY

## 2022-09-07 PROCEDURE — 25500020 PHARM REV CODE 255: Performed by: NURSE PRACTITIONER

## 2022-09-07 PROCEDURE — 74178 CT UROGRAM ABD PELVIS W WO: ICD-10-PCS | Mod: 26,,, | Performed by: RADIOLOGY

## 2022-09-07 PROCEDURE — 74178 CT ABD&PLV WO CNTR FLWD CNTR: CPT | Mod: TC

## 2022-09-07 RX ADMIN — IOHEXOL 125 ML: 350 INJECTION, SOLUTION INTRAVENOUS at 04:09

## 2022-09-15 LAB
FINAL PATHOLOGIC DIAGNOSIS: NORMAL
Lab: NORMAL
MICROSCOPIC EXAM: NORMAL

## 2022-09-21 ENCOUNTER — PROCEDURE VISIT (OUTPATIENT)
Dept: UROLOGY | Facility: CLINIC | Age: 87
End: 2022-09-21
Attending: UROLOGY
Payer: MEDICARE

## 2022-09-21 VITALS — DIASTOLIC BLOOD PRESSURE: 70 MMHG | HEART RATE: 84 BPM | SYSTOLIC BLOOD PRESSURE: 159 MMHG

## 2022-09-21 DIAGNOSIS — C67.9 BLADDER CARCINOMA: Primary | ICD-10-CM

## 2022-09-21 LAB
BILIRUB SERPL-MCNC: NEGATIVE MG/DL
BLOOD URINE, POC: 250
CLARITY, POC UA: CLEAR
COLOR, POC UA: YELLOW
GLUCOSE UR QL STRIP: NORMAL
KETONES UR QL STRIP: NEGATIVE
LEUKOCYTE ESTERASE URINE, POC: NEGATIVE
NITRITE, POC UA: NEGATIVE
PH, POC UA: 5
PROTEIN, POC: NEGATIVE
SPECIFIC GRAVITY, POC UA: 1.01
UROBILINOGEN, POC UA: NORMAL

## 2022-09-21 PROCEDURE — 52000 CYSTOSCOPY: ICD-10-PCS | Mod: S$GLB,,, | Performed by: UROLOGY

## 2022-09-21 PROCEDURE — 81002 URINALYSIS NONAUTO W/O SCOPE: CPT | Mod: S$GLB,,, | Performed by: UROLOGY

## 2022-09-21 PROCEDURE — 52000 CYSTOURETHROSCOPY: CPT | Mod: S$GLB,,, | Performed by: UROLOGY

## 2022-09-21 PROCEDURE — 81002 POCT URINE DIPSTICK WITHOUT MICROSCOPE: ICD-10-PCS | Mod: S$GLB,,, | Performed by: UROLOGY

## 2022-09-21 RX ORDER — LIDOCAINE HYDROCHLORIDE 20 MG/ML
JELLY TOPICAL
Status: COMPLETED | OUTPATIENT
Start: 2022-09-21 | End: 2022-09-21

## 2022-09-21 RX ORDER — CIPROFLOXACIN 500 MG/1
500 TABLET ORAL
Status: COMPLETED | OUTPATIENT
Start: 2022-09-21 | End: 2022-09-21

## 2022-09-21 RX ADMIN — CIPROFLOXACIN 500 MG: 500 TABLET ORAL at 10:09

## 2022-09-21 RX ADMIN — LIDOCAINE HYDROCHLORIDE 1 ML: 20 JELLY TOPICAL at 10:09

## 2022-09-21 NOTE — PROCEDURES
Cystoscopy    Date/Time: 9/21/2022 10:00 AM  Performed by: Ifrah Falk MD  Authorized by: Ifrah Falk MD     Consent Done?:  Yes (Written)  Timeout: prior to procedure the correct patient, procedure, and site was verified    Prep: patient was prepped and draped in usual sterile fashion    Anesthesia:  Lidocaine jelly  Indications: history bladder cancer    Position:  Dorsal lithotomy  Anesthesia:  Lidocaine jelly  Patient sedated?: No    Preparation: Patient was prepped and draped in usual sterile fashion    Scope type:  Flexible cystoscope  Stent inserted: No    Stent removed: No    External exam normal: Yes    Digital exam performed: No    Urethra normal: Yes    Bladder neck normal: Yes    Bladder normal: No (Stable area of erythema in R lateral bladder wall (photo taken) - present and unchanged for years.)     patient tolerated the procedure well with no immediate complications  Comments:        No tumors. Stable area of erythema - recent urine cytology negative. No further workup necessary. Repeat cysto in 1 year.

## 2023-03-30 DIAGNOSIS — Z12.31 ENCOUNTER FOR SCREENING MAMMOGRAM FOR MALIGNANT NEOPLASM OF BREAST: Primary | ICD-10-CM

## 2023-04-12 ENCOUNTER — HOSPITAL ENCOUNTER (OUTPATIENT)
Dept: RADIOLOGY | Facility: HOSPITAL | Age: 88
Discharge: HOME | End: 2023-04-12
Attending: INTERNAL MEDICINE
Payer: MEDICARE

## 2023-04-12 DIAGNOSIS — Z12.31 ENCOUNTER FOR SCREENING MAMMOGRAM FOR MALIGNANT NEOPLASM OF BREAST: ICD-10-CM

## 2023-04-12 PROCEDURE — 77067 MAMMO DIGITAL SCREENING BILAT WITH TOMO: ICD-10-PCS | Mod: 26,,, | Performed by: RADIOLOGY

## 2023-04-12 PROCEDURE — 77067 SCR MAMMO BI INCL CAD: CPT | Mod: 26,,, | Performed by: RADIOLOGY

## 2023-04-12 PROCEDURE — 77067 SCR MAMMO BI INCL CAD: CPT | Mod: TC

## 2023-04-12 PROCEDURE — 77063 BREAST TOMOSYNTHESIS BI: CPT | Mod: 26,,, | Performed by: RADIOLOGY

## 2023-04-12 PROCEDURE — 77063 MAMMO DIGITAL SCREENING BILAT WITH TOMO: ICD-10-PCS | Mod: 26,,, | Performed by: RADIOLOGY

## 2023-12-21 ENCOUNTER — OFFICE VISIT (OUTPATIENT)
Dept: UROLOGY | Facility: CLINIC | Age: 88
End: 2023-12-21
Payer: MEDICARE

## 2023-12-21 DIAGNOSIS — C67.9 BLADDER CARCINOMA: ICD-10-CM

## 2023-12-21 DIAGNOSIS — C67.2 MALIGNANT NEOPLASM OF LATERAL WALL OF URINARY BLADDER: Primary | ICD-10-CM

## 2023-12-21 PROCEDURE — 99999 PR PBB SHADOW E&M-EST. PATIENT-LVL III: ICD-10-PCS | Mod: PBBFAC,,, | Performed by: UROLOGY

## 2023-12-21 PROCEDURE — 99999 PR PBB SHADOW E&M-EST. PATIENT-LVL III: CPT | Mod: PBBFAC,,, | Performed by: UROLOGY

## 2023-12-21 PROCEDURE — 99213 OFFICE O/P EST LOW 20 MIN: CPT | Mod: S$PBB,,, | Performed by: UROLOGY

## 2023-12-21 PROCEDURE — 99213 OFFICE O/P EST LOW 20 MIN: CPT | Mod: PBBFAC | Performed by: UROLOGY

## 2023-12-21 PROCEDURE — 99213 PR OFFICE/OUTPT VISIT, EST, LEVL III, 20-29 MIN: ICD-10-PCS | Mod: S$PBB,,, | Performed by: UROLOGY

## 2023-12-21 NOTE — PROGRESS NOTES
Subjective:       Ella Sarmiento is a 88 y.o. female who is an established patient who was referred by Dr Cooper  for evaluation of hematuria.      Hematuria  Patient complains of gross hematuria. Onset of hematuria was 2 months ago and was sudden in onset. She had some crampy, abdominal pain very briefly at that time. There is not a history of nephrolithiasis. There is not a history of urologic trauma. Other urologic symptoms include rare urge incontinence. Patient admits to history of no risk factors for cancer. Patient denies history of chronic Vera catheter,  surgeries, occupational exposure, sexually transmitted diseases, tobacco use, trauma and urolithiasis. Prior workup has been none. Denies UTIs.     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 She is s/p TURBT with mitomycin on 3/3/17. 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 ReTURBT on 4/7/17 was negative.     PVR (bladder scan) last visit- 41cc    She has had BCG induction and maintenance x 1 year with regular cystoscopy. Prior cystoscopy in office showed areas of concern in posterior bladder wall. Cysto/BBx in OR on 4/13/18 was negative (chronic inflammation).     CT 3/2018 - negative  CT 9/2022 - negative    Cytology 9/2020 - negative    Last cysto 9/2022 - no tumors. Stable area of erythema - recent urine cytology negative. No further workup necessary. Planned for annual cysto, scheduled for regular f/u visit today. Denies hematuria, UTIs. No LUTS.       The following portions of the patient's history were reviewed and updated as appropriate: allergies, current medications, past family history, past medical history, past social history, past surgical history and problem list.    Review of Systems  Constitutional: no fever or chills  ENT: no nasal congestion or sore throat  Respiratory: no cough or shortness of breath  Cardiovascular: no chest pain or palpitations  Gastrointestinal: no nausea or vomiting, tolerating diet  Genitourinary: as  per HPI  Hematologic/Lymphatic: no easy bruising or lymphadenopathy  Musculoskeletal: no arthralgias or myalgias  Skin: no rashes or lesions  Neurological: no seizures or tremors  Behavioral/Psych: no auditory or visual hallucinations       Objective:    Vitals:   There were no vitals taken for this visit.    Physical Exam   General: well developed, well nourished in no acute distress  Head: normocephalic, atraumatic  Neck: supple, trachea midline, no obvious enlargement of thyroid  HEENT: EOMI, mucus membranes moist, sclera anicteric, no hearing impairment  Lungs: symmetric expansion, non-labored breathing  Neuro: alert and oriented x 3, no gross deficits  Psych: normal judgment and insight, normal mood/affect and non-anxious  Genitourinary:   deferred       Lab Review   Urine analysis today in clinic shows - no urine    Lab Results   Component Value Date    WBC 6.48 10/17/2018    HGB 13.2 10/23/2018    HCT 41.4 10/23/2018    MCV 96 10/17/2018     10/17/2018     Lab Results   Component Value Date    CREATININE 0.8 09/07/2022    BUN 13 09/07/2022     Imaging  Images and reports were personally reviewed by me and discussed with patient   CT reviewed      Assessment/Plan:      1. Bladder cancer   - Discussed etiology and workup of hematuria   - Ucx - urine clear today, no need for UCx   - CT urogram - R lateral bladder wall tumor   - Office cystoscopy - tumor in R bladder   - TURBT + mitomycin on 3/3/17 - LG with focal HG Ta   - ReTURBT 4/7/17 - negative    - BCG (due to focal HG and large size)   - BCG maintenance x 1 year (intermediate risk)   - Cystoscopy now done annually, due 9/2023. Will schedule for next available.      2. Dysuria   - Pyridium PRN       Follow up for cystoscopy

## 2024-01-08 ENCOUNTER — PROCEDURE VISIT (OUTPATIENT)
Dept: UROLOGY | Facility: CLINIC | Age: 89
End: 2024-01-08
Payer: MEDICARE

## 2024-01-08 VITALS — WEIGHT: 156.06 LBS | BODY MASS INDEX: 27.65 KG/M2

## 2024-01-08 DIAGNOSIS — C67.9 BLADDER CARCINOMA: ICD-10-CM

## 2024-01-08 PROCEDURE — 52000 CYSTOURETHROSCOPY: CPT | Mod: PBBFAC | Performed by: UROLOGY

## 2024-05-09 DIAGNOSIS — Z12.31 ENCOUNTER FOR SCREENING MAMMOGRAM FOR MALIGNANT NEOPLASM OF BREAST: Primary | ICD-10-CM

## 2024-05-10 ENCOUNTER — HOSPITAL ENCOUNTER (OUTPATIENT)
Dept: RADIOLOGY | Facility: HOSPITAL | Age: 89
Discharge: HOME | End: 2024-05-10
Attending: INTERNAL MEDICINE
Payer: MEDICARE

## 2024-05-10 DIAGNOSIS — Z12.31 ENCOUNTER FOR SCREENING MAMMOGRAM FOR MALIGNANT NEOPLASM OF BREAST: ICD-10-CM

## 2024-05-10 PROCEDURE — 77063 BREAST TOMOSYNTHESIS BI: CPT | Mod: 26,,, | Performed by: RADIOLOGY

## 2024-05-10 PROCEDURE — 77067 SCR MAMMO BI INCL CAD: CPT | Mod: TC

## 2024-05-10 PROCEDURE — 77067 SCR MAMMO BI INCL CAD: CPT | Mod: 26,,, | Performed by: RADIOLOGY

## 2025-02-13 ENCOUNTER — TELEPHONE (OUTPATIENT)
Dept: UROLOGY | Facility: CLINIC | Age: 89
End: 2025-02-13
Payer: MEDICARE

## 2025-02-13 NOTE — TELEPHONE ENCOUNTER
----- Message from Enedelia sent at 2/13/2025  2:08 PM CST -----  Regarding: Request for Sooner Apt  Type:  Sooner Appointment Request    Patient is requesting a sooner appointment.  Patient declined first available appointment listed as well as another facility and provider .  Patient will not accept being placed on the waitlist and is requesting a message be sent to doctor.    Name of Caller: Self     When is the first available appointment? April 3rd     Symptoms: follow up     Would the patient rather a call back or a response via [facility]? Call     Best Call Back Number: .[phone]      Additional Information: she does not have a vehicle on Monday or Thursday's and would like to get an apt on a Friday

## 2025-03-11 ENCOUNTER — OFFICE VISIT (OUTPATIENT)
Dept: UROLOGY | Facility: CLINIC | Age: 89
End: 2025-03-11
Payer: MEDICARE

## 2025-03-11 VITALS — BODY MASS INDEX: 26.73 KG/M2 | WEIGHT: 150.88 LBS

## 2025-03-11 DIAGNOSIS — C67.2 MALIGNANT NEOPLASM OF LATERAL WALL OF URINARY BLADDER: ICD-10-CM

## 2025-03-11 DIAGNOSIS — C67.9 BLADDER CARCINOMA: Primary | ICD-10-CM

## 2025-03-11 PROCEDURE — 99213 OFFICE O/P EST LOW 20 MIN: CPT | Mod: PBBFAC | Performed by: UROLOGY

## 2025-03-11 PROCEDURE — 52000 CYSTOURETHROSCOPY: CPT | Mod: PBBFAC | Performed by: UROLOGY

## 2025-03-11 PROCEDURE — 52000 CYSTOURETHROSCOPY: CPT | Mod: S$PBB,,, | Performed by: UROLOGY

## 2025-03-11 PROCEDURE — 99999 PR PBB SHADOW E&M-EST. PATIENT-LVL III: CPT | Mod: PBBFAC,,, | Performed by: UROLOGY

## 2025-03-11 PROCEDURE — 99499 UNLISTED E&M SERVICE: CPT | Mod: S$PBB,,, | Performed by: UROLOGY

## 2025-03-11 NOTE — PROGRESS NOTES
Procedure Orders   Cystoscopy [[id number]] ordered by Ifrah Falk MD     Pre-procedure Diagnoses   Bladder carcinoma [C67.9]     Post-procedure Diagnoses   Bladder carcinoma [C67.9]            Cystoscopy     Date: 3/11/2025     Performed by: Ifrah Falk MD  Authorized by: Ifrah Falk MD    Consent Done?:  Yes (Written)  Timeout: prior to procedure the correct patient, procedure, and site was verified    Prep: patient was prepped and draped in usual sterile fashion    Anesthesia:  Lidocaine jelly  Indications: history bladder cancer    Position:  Dorsal lithotomy  Anesthesia:  Lidocaine jelly  Patient sedated?: No    Preparation: Patient was prepped and draped in usual sterile fashion    Scope type:  Flexible cystoscope  Stent inserted: No    Stent removed: No    External exam normal: Yes    Digital exam performed: No    Urethra normal: Yes    Bladder neck normal: Yes    Bladder normal: Yes (Similar area of scant erythema in R lateral bladder wall - improving from previous checks.)  Prior resection site on L lateral BW.    patient tolerated the procedure well with no immediate complications  Comments:        Repeat cystoscopy in 1 year.

## (undated) DEVICE — BLANKET UPPER BODY 78.7X29.9IN

## (undated) DEVICE — PAD COLD THERAPY KNEE WRAP ON

## (undated) DEVICE — COVER SNAP KAP 26IN

## (undated) DEVICE — GAUZE SPONGE 4'X4 12 PLY

## (undated) DEVICE — KIT CATH URTRL CONE TIP 5F

## (undated) DEVICE — NDL 18GA X1 1/2 REG BEVEL

## (undated) DEVICE — SOL NACL 0.9% INJ PF/100 150

## (undated) DEVICE — MAT QUICK 40X30 FLOOR FLUID LF

## (undated) DEVICE — SUPPORT ULNA NERVE PROTECTOR

## (undated) DEVICE — Device

## (undated) DEVICE — SUT 1 18IN COATED VICRYL U

## (undated) DEVICE — PAD ABD 8X10 STERILE

## (undated) DEVICE — BLADE SAW OSC 19.5 X 1.2 X 90

## (undated) DEVICE — GLOVE SURGICAL LATEX SZ 6.5

## (undated) DEVICE — BANDAGE ACE ELASTIC 6"

## (undated) DEVICE — SEE MEDLINE ITEM 152622

## (undated) DEVICE — GLOVE BIOGEL ECLIPSE SZ 7

## (undated) DEVICE — SOL 9P NACL IRR PIC IL

## (undated) DEVICE — HOOD FLYTE PEELWY STERISHIELD

## (undated) DEVICE — SYR 10CC LUER LOCK

## (undated) DEVICE — COLLECTION SPECIMEN NEPTUNE

## (undated) DEVICE — GLOVE SURGICAL LATEX SZ 8

## (undated) DEVICE — NDL SPINAL 20GX3.5 HUB

## (undated) DEVICE — TOURNIQUET SB QC DP 34X4IN

## (undated) DEVICE — SEE MEDLINE ITEM 152487

## (undated) DEVICE — ELECTRODE REM PLYHSV RETURN 9

## (undated) DEVICE — SOL IRR WATER STRL 3000 ML

## (undated) DEVICE — SYR 50CC LL

## (undated) DEVICE — CANISTER SUCTION 2 LTR

## (undated) DEVICE — GLOVE BIOGEL ECLIPSE SZ 7.5

## (undated) DEVICE — TAPE SILK 3IN

## (undated) DEVICE — PULSAVAC ZIMMER

## (undated) DEVICE — UNDERGLOVES BIOGEL PI SIZE 8

## (undated) DEVICE — CHLORAPREP W TINT 26ML APPL

## (undated) DEVICE — SEE MEDLINE ITEM 146373

## (undated) DEVICE — SOL IRR NACL .9% 3000ML

## (undated) DEVICE — SYR ONLY LUER LOCK 20CC

## (undated) DEVICE — COVER OVERHEAD SURG LT BLUE

## (undated) DEVICE — PADDING CAST SPECIALIST 6X4YD

## (undated) DEVICE — GLOVE BIOGEL PI MICRO SZ 7

## (undated) DEVICE — PIN PINABALL HEADLESS: Type: IMPLANTABLE DEVICE | Site: KNEE | Status: NON-FUNCTIONAL

## (undated) DEVICE — ELECTRODE LOOP CUTTING BIPOLAR

## (undated) DEVICE — SEE L#120831

## (undated) DEVICE — SEE MEDLINE ITEM 146345

## (undated) DEVICE — DRAPE STERI U-SHAPED 47X51IN

## (undated) DEVICE — SUT 2/0 18IN COATED VICRYL

## (undated) DEVICE — SLEEVE SCD EXPRESS CALF MEDIUM